# Patient Record
Sex: MALE | Race: WHITE | HISPANIC OR LATINO | Employment: OTHER | URBAN - METROPOLITAN AREA
[De-identification: names, ages, dates, MRNs, and addresses within clinical notes are randomized per-mention and may not be internally consistent; named-entity substitution may affect disease eponyms.]

---

## 2017-09-13 ENCOUNTER — HISTORICAL (OUTPATIENT)
Dept: ADMINISTRATIVE | Facility: HOSPITAL | Age: 67
End: 2017-09-13

## 2017-09-13 LAB
ABS NEUT (OLG): 3.43 X10(3)/MCL (ref 2.1–9.2)
ALBUMIN SERPL-MCNC: 3.8 GM/DL (ref 3.4–5)
ALBUMIN/GLOB SERPL: 1.2 RATIO (ref 1.1–2)
ALP SERPL-CCNC: 77 UNIT/L (ref 50–136)
ALT SERPL-CCNC: 20 UNIT/L (ref 12–78)
AST SERPL-CCNC: 16 UNIT/L (ref 15–37)
BASOPHILS # BLD AUTO: 0 X10(3)/MCL (ref 0–0.2)
BASOPHILS NFR BLD AUTO: 0.3 %
BILIRUB SERPL-MCNC: 0.5 MG/DL (ref 0.2–1)
BILIRUBIN DIRECT+TOT PNL SERPL-MCNC: 0.2 MG/DL (ref 0–0.5)
BILIRUBIN DIRECT+TOT PNL SERPL-MCNC: 0.3 MG/DL (ref 0–0.8)
BUN SERPL-MCNC: 9 MG/DL (ref 7–18)
CALCIUM SERPL-MCNC: 9.1 MG/DL (ref 8.5–10.1)
CHLORIDE SERPL-SCNC: 106 MMOL/L (ref 98–107)
CO2 SERPL-SCNC: 26 MMOL/L (ref 21–32)
CREAT SERPL-MCNC: 0.65 MG/DL (ref 0.7–1.3)
EOSINOPHIL # BLD AUTO: 0.2 X10(3)/MCL (ref 0–0.9)
EOSINOPHIL NFR BLD AUTO: 2.7 %
ERYTHROCYTE [DISTWIDTH] IN BLOOD BY AUTOMATED COUNT: 12.2 % (ref 11.5–17)
GLOBULIN SER-MCNC: 3.1 GM/DL (ref 2.4–3.5)
GLUCOSE SERPL-MCNC: 92 MG/DL (ref 74–106)
HCT VFR BLD AUTO: 39.8 % (ref 42–52)
HGB BLD-MCNC: 13.5 GM/DL (ref 14–18)
LYMPHOCYTES # BLD AUTO: 1.6 X10(3)/MCL (ref 0.6–4.6)
LYMPHOCYTES NFR BLD AUTO: 27.6 %
MCH RBC QN AUTO: 33.5 PG (ref 27–31)
MCHC RBC AUTO-ENTMCNC: 33.9 GM/DL (ref 33–36)
MCV RBC AUTO: 98.8 FL (ref 80–94)
MONOCYTES # BLD AUTO: 0.6 X10(3)/MCL (ref 0.1–1.3)
MONOCYTES NFR BLD AUTO: 10.9 %
NEUTROPHILS # BLD AUTO: 3.4 X10(3)/MCL (ref 2.1–9.2)
NEUTROPHILS NFR BLD AUTO: 58.5 %
PLATELET # BLD AUTO: 234 X10(3)/MCL (ref 130–400)
PMV BLD AUTO: 9.5 FL (ref 9.4–12.4)
POTASSIUM SERPL-SCNC: 4.5 MMOL/L (ref 3.5–5.1)
PROT SERPL-MCNC: 6.9 GM/DL (ref 6.4–8.2)
PROT SERPL-MCNC: 7 GM/DL
RBC # BLD AUTO: 4.03 X10(6)/MCL (ref 4.7–6.1)
SODIUM SERPL-SCNC: 143 MMOL/L (ref 136–145)
WBC # SPEC AUTO: 5.9 X10(3)/MCL (ref 4.5–11.5)

## 2018-03-01 ENCOUNTER — HISTORICAL (OUTPATIENT)
Dept: HEMATOLOGY/ONCOLOGY | Facility: CLINIC | Age: 68
End: 2018-03-01

## 2018-03-01 LAB
ABS NEUT (OLG): 3.3 X10(3)/MCL (ref 2.1–9.2)
ALBUMIN SERPL-MCNC: 3.8 GM/DL (ref 3.4–5)
ALBUMIN/GLOB SERPL: 1.3 {RATIO}
ALP SERPL-CCNC: 70 UNIT/L (ref 50–136)
ALT SERPL-CCNC: 22 UNIT/L (ref 12–78)
AST SERPL-CCNC: 18 UNIT/L (ref 15–37)
BASOPHILS # BLD AUTO: 0 X10(3)/MCL (ref 0–0.2)
BASOPHILS NFR BLD AUTO: 0.4 %
BILIRUB SERPL-MCNC: 0.9 MG/DL (ref 0.2–1)
BILIRUBIN DIRECT+TOT PNL SERPL-MCNC: 0.2 MG/DL (ref 0–0.2)
BILIRUBIN DIRECT+TOT PNL SERPL-MCNC: 0.7 MG/DL (ref 0–0.8)
BUN SERPL-MCNC: 10 MG/DL (ref 7–18)
CALCIUM SERPL-MCNC: 8.8 MG/DL (ref 8.5–10.1)
CHLORIDE SERPL-SCNC: 104 MMOL/L (ref 98–107)
CO2 SERPL-SCNC: 27 MMOL/L (ref 21–32)
CREAT SERPL-MCNC: 0.64 MG/DL (ref 0.7–1.3)
EOSINOPHIL # BLD AUTO: 0.1 X10(3)/MCL (ref 0–0.9)
EOSINOPHIL NFR BLD AUTO: 1.1 %
ERYTHROCYTE [DISTWIDTH] IN BLOOD BY AUTOMATED COUNT: 13 % (ref 11.5–17)
GLOBULIN SER-MCNC: 2.9 GM/DL (ref 2.4–3.5)
GLUCOSE SERPL-MCNC: 90 MG/DL (ref 74–106)
HCT VFR BLD AUTO: 39.2 % (ref 42–52)
HGB BLD-MCNC: 13.4 GM/DL (ref 14–18)
LYMPHOCYTES # BLD AUTO: 1.5 X10(3)/MCL (ref 0.6–4.6)
LYMPHOCYTES NFR BLD AUTO: 26.8 %
MCH RBC QN AUTO: 33.2 PG (ref 27–31)
MCHC RBC AUTO-ENTMCNC: 34.2 GM/DL (ref 33–36)
MCV RBC AUTO: 97 FL (ref 80–94)
MONOCYTES # BLD AUTO: 0.7 X10(3)/MCL (ref 0.1–1.3)
MONOCYTES NFR BLD AUTO: 12.6 %
NEUTROPHILS # BLD AUTO: 3.3 X10(3)/MCL (ref 2.1–9.2)
NEUTROPHILS NFR BLD AUTO: 59.1 %
PLATELET # BLD AUTO: 204 X10(3)/MCL (ref 130–400)
PMV BLD AUTO: 9.2 FL (ref 9.4–12.4)
POTASSIUM SERPL-SCNC: 4.5 MMOL/L (ref 3.5–5.1)
PROT SERPL-MCNC: 6.6 GM/DL
PROT SERPL-MCNC: 6.7 GM/DL (ref 6.4–8.2)
RBC # BLD AUTO: 4.04 X10(6)/MCL (ref 4.7–6.1)
SODIUM SERPL-SCNC: 138 MMOL/L (ref 136–145)
WBC # SPEC AUTO: 5.6 X10(3)/MCL (ref 4.5–11.5)

## 2018-09-04 ENCOUNTER — HISTORICAL (OUTPATIENT)
Dept: HEMATOLOGY/ONCOLOGY | Facility: CLINIC | Age: 68
End: 2018-09-04

## 2018-09-04 LAB
ABS NEUT (OLG): 4.13 X10(3)/MCL (ref 2.1–9.2)
ALBUMIN SERPL-MCNC: 3.8 GM/DL (ref 3.4–5)
ALBUMIN/GLOB SERPL: 1.2 {RATIO}
ALP SERPL-CCNC: 66 UNIT/L (ref 50–136)
ALT SERPL-CCNC: 38 UNIT/L (ref 12–78)
AST SERPL-CCNC: 24 UNIT/L (ref 15–37)
BASOPHILS # BLD AUTO: 0 X10(3)/MCL (ref 0–0.2)
BASOPHILS NFR BLD AUTO: 0.3 %
BILIRUB SERPL-MCNC: 0.9 MG/DL (ref 0.2–1)
BILIRUBIN DIRECT+TOT PNL SERPL-MCNC: 0.2 MG/DL (ref 0–0.2)
BILIRUBIN DIRECT+TOT PNL SERPL-MCNC: 0.7 MG/DL (ref 0–0.8)
BUN SERPL-MCNC: 11 MG/DL (ref 7–18)
CALCIUM SERPL-MCNC: 8.8 MG/DL (ref 8.5–10.1)
CHLORIDE SERPL-SCNC: 103 MMOL/L (ref 98–107)
CO2 SERPL-SCNC: 30 MMOL/L (ref 21–32)
CREAT SERPL-MCNC: 0.7 MG/DL (ref 0.7–1.3)
EOSINOPHIL # BLD AUTO: 0.2 X10(3)/MCL (ref 0–0.9)
EOSINOPHIL NFR BLD AUTO: 2.5 %
ERYTHROCYTE [DISTWIDTH] IN BLOOD BY AUTOMATED COUNT: 12.7 % (ref 11.5–17)
GLOBULIN SER-MCNC: 3.1 GM/DL (ref 2.4–3.5)
GLUCOSE SERPL-MCNC: 96 MG/DL (ref 74–106)
HCT VFR BLD AUTO: 40.6 % (ref 42–52)
HGB BLD-MCNC: 13.5 GM/DL (ref 14–18)
LYMPHOCYTES # BLD AUTO: 1.8 X10(3)/MCL (ref 0.6–4.6)
LYMPHOCYTES NFR BLD AUTO: 26.1 %
MCH RBC QN AUTO: 33.2 PG (ref 27–31)
MCHC RBC AUTO-ENTMCNC: 33.3 GM/DL (ref 33–36)
MCV RBC AUTO: 99.8 FL (ref 80–94)
MONOCYTES # BLD AUTO: 0.7 X10(3)/MCL (ref 0.1–1.3)
MONOCYTES NFR BLD AUTO: 10.4 %
NEUTROPHILS # BLD AUTO: 4.1 X10(3)/MCL (ref 2.1–9.2)
NEUTROPHILS NFR BLD AUTO: 60.7 %
PLATELET # BLD AUTO: 187 X10(3)/MCL (ref 130–400)
PMV BLD AUTO: 10.4 FL (ref 9.4–12.4)
POTASSIUM SERPL-SCNC: 4.9 MMOL/L (ref 3.5–5.1)
PROT SERPL-MCNC: 6.7 GM/DL
PROT SERPL-MCNC: 6.9 GM/DL (ref 6.4–8.2)
RBC # BLD AUTO: 4.07 X10(6)/MCL (ref 4.7–6.1)
SODIUM SERPL-SCNC: 139 MMOL/L (ref 136–145)
WBC # SPEC AUTO: 6.8 X10(3)/MCL (ref 4.5–11.5)

## 2018-09-13 ENCOUNTER — HISTORICAL (OUTPATIENT)
Dept: ADMINISTRATIVE | Facility: HOSPITAL | Age: 68
End: 2018-09-13

## 2018-09-13 LAB — PSA SERPL-MCNC: 2.96 NG/ML (ref 0–4)

## 2019-04-01 ENCOUNTER — HISTORICAL (OUTPATIENT)
Dept: HEMATOLOGY/ONCOLOGY | Facility: CLINIC | Age: 69
End: 2019-04-01

## 2019-04-01 LAB
ABS NEUT (OLG): 3.65 X10(3)/MCL (ref 2.1–9.2)
ALBUMIN SERPL-MCNC: 3.8 GM/DL (ref 3.4–5)
ALBUMIN/GLOB SERPL: 1.3 {RATIO}
ALP SERPL-CCNC: 70 UNIT/L (ref 50–136)
ALT SERPL-CCNC: 24 UNIT/L (ref 12–78)
AST SERPL-CCNC: 17 UNIT/L (ref 15–37)
BASOPHILS # BLD AUTO: 0 X10(3)/MCL (ref 0–0.2)
BASOPHILS NFR BLD AUTO: 0.5 %
BILIRUB SERPL-MCNC: 1.2 MG/DL (ref 0.2–1)
BILIRUBIN DIRECT+TOT PNL SERPL-MCNC: 0.2 MG/DL (ref 0–0.2)
BILIRUBIN DIRECT+TOT PNL SERPL-MCNC: 1 MG/DL (ref 0–0.8)
BUN SERPL-MCNC: 10 MG/DL (ref 7–18)
CALCIUM SERPL-MCNC: 8.9 MG/DL (ref 8.5–10.1)
CHLORIDE SERPL-SCNC: 105 MMOL/L (ref 98–107)
CO2 SERPL-SCNC: 27 MMOL/L (ref 21–32)
CREAT SERPL-MCNC: 0.71 MG/DL (ref 0.7–1.3)
EOSINOPHIL # BLD AUTO: 0.1 X10(3)/MCL (ref 0–0.9)
EOSINOPHIL NFR BLD AUTO: 2.3 %
ERYTHROCYTE [DISTWIDTH] IN BLOOD BY AUTOMATED COUNT: 12.6 % (ref 11.5–17)
GLOBULIN SER-MCNC: 3 GM/DL (ref 2.4–3.5)
GLUCOSE SERPL-MCNC: 94 MG/DL (ref 74–106)
HCT VFR BLD AUTO: 40.8 % (ref 42–52)
HGB BLD-MCNC: 13.6 GM/DL (ref 14–18)
LYMPHOCYTES # BLD AUTO: 1.5 X10(3)/MCL (ref 0.6–4.6)
LYMPHOCYTES NFR BLD AUTO: 24.8 %
MCH RBC QN AUTO: 33.4 PG (ref 27–31)
MCHC RBC AUTO-ENTMCNC: 33.3 GM/DL (ref 33–36)
MCV RBC AUTO: 100.2 FL (ref 80–94)
MONOCYTES # BLD AUTO: 0.6 X10(3)/MCL (ref 0.1–1.3)
MONOCYTES NFR BLD AUTO: 10.9 %
NEUTROPHILS # BLD AUTO: 3.6 X10(3)/MCL (ref 2.1–9.2)
NEUTROPHILS NFR BLD AUTO: 61.2 %
PLATELET # BLD AUTO: 230 X10(3)/MCL (ref 130–400)
PMV BLD AUTO: 9.4 FL (ref 9.4–12.4)
POTASSIUM SERPL-SCNC: 4.6 MMOL/L (ref 3.5–5.1)
PROT SERPL-MCNC: 6.5 GM/DL
PROT SERPL-MCNC: 6.8 GM/DL (ref 6.4–8.2)
RBC # BLD AUTO: 4.07 X10(6)/MCL (ref 4.7–6.1)
SODIUM SERPL-SCNC: 138 MMOL/L (ref 136–145)
WBC # SPEC AUTO: 6 X10(3)/MCL (ref 4.5–11.5)

## 2019-09-30 ENCOUNTER — HISTORICAL (OUTPATIENT)
Dept: HEMATOLOGY/ONCOLOGY | Facility: CLINIC | Age: 69
End: 2019-09-30

## 2019-09-30 LAB
ABS NEUT (OLG): 4.03 X10(3)/MCL (ref 2.1–9.2)
ALBUMIN SERPL-MCNC: 3.8 GM/DL (ref 3.4–5)
ALBUMIN/GLOB SERPL: 1.4 RATIO (ref 1.1–2)
ALP SERPL-CCNC: 65 UNIT/L (ref 50–136)
ALT SERPL-CCNC: 27 UNIT/L (ref 12–78)
AST SERPL-CCNC: 19 UNIT/L (ref 15–37)
BASOPHILS # BLD AUTO: 0 X10(3)/MCL (ref 0–0.2)
BASOPHILS NFR BLD AUTO: 0.4 %
BILIRUB SERPL-MCNC: 0.9 MG/DL (ref 0.2–1)
BILIRUBIN DIRECT+TOT PNL SERPL-MCNC: 0.2 MG/DL (ref 0–0.5)
BILIRUBIN DIRECT+TOT PNL SERPL-MCNC: 0.7 MG/DL (ref 0–0.8)
BUN SERPL-MCNC: 14 MG/DL (ref 7–18)
CALCIUM SERPL-MCNC: 9.1 MG/DL (ref 8.5–10.1)
CHLORIDE SERPL-SCNC: 103 MMOL/L (ref 98–107)
CO2 SERPL-SCNC: 31 MMOL/L (ref 21–32)
CREAT SERPL-MCNC: 0.74 MG/DL (ref 0.7–1.3)
EOSINOPHIL # BLD AUTO: 0.2 X10(3)/MCL (ref 0–0.9)
EOSINOPHIL NFR BLD AUTO: 2.5 %
ERYTHROCYTE [DISTWIDTH] IN BLOOD BY AUTOMATED COUNT: 12.5 % (ref 11.5–17)
GLOBULIN SER-MCNC: 2.8 GM/DL (ref 2.4–3.5)
GLUCOSE SERPL-MCNC: 102 MG/DL (ref 74–106)
HCT VFR BLD AUTO: 42 % (ref 42–52)
HGB BLD-MCNC: 14 GM/DL (ref 14–18)
LYMPHOCYTES # BLD AUTO: 1.7 X10(3)/MCL (ref 0.6–4.6)
LYMPHOCYTES NFR BLD AUTO: 25.5 %
MCH RBC QN AUTO: 33 PG (ref 27–31)
MCHC RBC AUTO-ENTMCNC: 33.3 GM/DL (ref 33–36)
MCV RBC AUTO: 99.1 FL (ref 80–94)
MONOCYTES # BLD AUTO: 0.8 X10(3)/MCL (ref 0.1–1.3)
MONOCYTES NFR BLD AUTO: 11.4 %
NEUTROPHILS # BLD AUTO: 4 X10(3)/MCL (ref 2.1–9.2)
NEUTROPHILS NFR BLD AUTO: 59.9 %
PLATELET # BLD AUTO: 194 X10(3)/MCL (ref 130–400)
PMV BLD AUTO: 8.7 FL (ref 9.4–12.4)
POTASSIUM SERPL-SCNC: 4.8 MMOL/L (ref 3.5–5.1)
PROT SERPL-MCNC: 6.4 GM/DL
PROT SERPL-MCNC: 6.6 GM/DL (ref 6.4–8.2)
RBC # BLD AUTO: 4.24 X10(6)/MCL (ref 4.7–6.1)
SODIUM SERPL-SCNC: 138 MMOL/L (ref 136–145)
WBC # SPEC AUTO: 6.7 X10(3)/MCL (ref 4.5–11.5)

## 2019-11-18 ENCOUNTER — HISTORICAL (OUTPATIENT)
Dept: ADMINISTRATIVE | Facility: HOSPITAL | Age: 69
End: 2019-11-18

## 2019-12-31 LAB
INFLUENZA A ANTIGEN, POC: POSITIVE
INFLUENZA B ANTIGEN, POC: NEGATIVE
RAPID GROUP A STREP (OHS): NEGATIVE

## 2020-02-17 LAB — CRC RECOMMENDATION EXT: NORMAL

## 2020-04-06 ENCOUNTER — HISTORICAL (OUTPATIENT)
Dept: HEMATOLOGY/ONCOLOGY | Facility: CLINIC | Age: 70
End: 2020-04-06

## 2020-04-06 LAB
ABS NEUT (OLG): 4.33 X10(3)/MCL (ref 2.1–9.2)
ALBUMIN SERPL-MCNC: 4 GM/DL (ref 3.4–4.8)
ALBUMIN/GLOB SERPL: 1.4 RATIO (ref 1.1–2)
ALP SERPL-CCNC: 78 UNIT/L (ref 40–150)
ALT SERPL-CCNC: 24 UNIT/L (ref 0–55)
AST SERPL-CCNC: 23 UNIT/L (ref 5–34)
B2 MICROGLOB SERPL-MCNC: 1.49 MG/L
BASOPHILS # BLD AUTO: 0 X10(3)/MCL (ref 0–0.2)
BASOPHILS NFR BLD AUTO: 0.4 %
BILIRUB SERPL-MCNC: 0.7 MG/DL
BILIRUBIN DIRECT+TOT PNL SERPL-MCNC: 0.3 MG/DL (ref 0–0.5)
BILIRUBIN DIRECT+TOT PNL SERPL-MCNC: 0.4 MG/DL (ref 0–0.8)
BUN SERPL-MCNC: 15 MG/DL (ref 8.4–25.7)
CALCIUM SERPL-MCNC: 8.7 MG/DL (ref 8.8–10)
CHLORIDE SERPL-SCNC: 105 MMOL/L (ref 98–107)
CO2 SERPL-SCNC: 28 MMOL/L (ref 23–31)
CREAT SERPL-MCNC: 0.82 MG/DL (ref 0.73–1.18)
EOSINOPHIL # BLD AUTO: 0.1 X10(3)/MCL (ref 0–0.9)
EOSINOPHIL NFR BLD AUTO: 1.6 %
ERYTHROCYTE [DISTWIDTH] IN BLOOD BY AUTOMATED COUNT: 13 % (ref 11.5–17)
GLOBULIN SER-MCNC: 2.9 GM/DL (ref 2.4–3.5)
GLUCOSE SERPL-MCNC: 100 MG/DL (ref 82–115)
HCT VFR BLD AUTO: 42 % (ref 42–52)
HGB BLD-MCNC: 14 GM/DL (ref 14–18)
LYMPHOCYTES # BLD AUTO: 1.7 X10(3)/MCL (ref 0.6–4.6)
LYMPHOCYTES NFR BLD AUTO: 24 %
MCH RBC QN AUTO: 33.7 PG (ref 27–31)
MCHC RBC AUTO-ENTMCNC: 33.3 GM/DL (ref 33–36)
MCV RBC AUTO: 101 FL (ref 80–94)
MONOCYTES # BLD AUTO: 0.8 X10(3)/MCL (ref 0.1–1.3)
MONOCYTES NFR BLD AUTO: 11.8 %
NEUTROPHILS # BLD AUTO: 4.3 X10(3)/MCL (ref 2.1–9.2)
NEUTROPHILS NFR BLD AUTO: 62.1 %
PLATELET # BLD AUTO: 206 X10(3)/MCL (ref 130–400)
PMV BLD AUTO: 9.3 FL (ref 9.4–12.4)
POTASSIUM SERPL-SCNC: 4.7 MMOL/L (ref 3.5–5.1)
PROT SERPL-MCNC: 6.8 GM/DL
PROT SERPL-MCNC: 6.9 GM/DL (ref 5.8–7.6)
RBC # BLD AUTO: 4.16 X10(6)/MCL (ref 4.7–6.1)
SODIUM SERPL-SCNC: 139 MMOL/L (ref 136–145)
WBC # SPEC AUTO: 7 X10(3)/MCL (ref 4.5–11.5)

## 2020-08-11 ENCOUNTER — HISTORICAL (OUTPATIENT)
Dept: HEMATOLOGY/ONCOLOGY | Facility: CLINIC | Age: 70
End: 2020-08-11

## 2020-08-11 LAB
ABS NEUT (OLG): 5.07 X10(3)/MCL (ref 2.1–9.2)
ALBUMIN SERPL-MCNC: 3.9 GM/DL (ref 3.4–5)
ALBUMIN/GLOB SERPL: 1.3 RATIO (ref 1.1–2)
ALP SERPL-CCNC: 81 UNIT/L (ref 40–150)
ALT SERPL-CCNC: 23 UNIT/L (ref 0–55)
AST SERPL-CCNC: 24 UNIT/L (ref 5–34)
B2 MICROGLOB SERPL-MCNC: 1.7 MG/L
BASOPHILS # BLD AUTO: 0 X10(3)/MCL (ref 0–0.2)
BASOPHILS NFR BLD AUTO: 0.3 %
BILIRUB SERPL-MCNC: 1.3 MG/DL
BILIRUBIN DIRECT+TOT PNL SERPL-MCNC: 0.5 MG/DL (ref 0–0.5)
BILIRUBIN DIRECT+TOT PNL SERPL-MCNC: 0.8 MG/DL (ref 0–0.8)
BUN SERPL-MCNC: 15.4 MG/DL (ref 8.4–25.7)
CALCIUM SERPL-MCNC: 9.1 MG/DL (ref 8.8–10)
CHLORIDE SERPL-SCNC: 102 MMOL/L (ref 98–107)
CO2 SERPL-SCNC: 26 MMOL/L (ref 23–31)
CREAT SERPL-MCNC: 0.77 MG/DL (ref 0.73–1.18)
EOSINOPHIL # BLD AUTO: 0.2 X10(3)/MCL (ref 0–0.9)
EOSINOPHIL NFR BLD AUTO: 2.4 %
ERYTHROCYTE [DISTWIDTH] IN BLOOD BY AUTOMATED COUNT: 12.3 % (ref 11.5–17)
GLOBULIN SER-MCNC: 3 GM/DL (ref 2.4–3.5)
GLUCOSE SERPL-MCNC: 89 MG/DL (ref 82–115)
HCT VFR BLD AUTO: 42.4 % (ref 42–52)
HGB BLD-MCNC: 14.1 GM/DL (ref 14–18)
LYMPHOCYTES # BLD AUTO: 1.9 X10(3)/MCL (ref 0.6–4.6)
LYMPHOCYTES NFR BLD AUTO: 23.4 %
MCH RBC QN AUTO: 33.5 PG (ref 27–31)
MCHC RBC AUTO-ENTMCNC: 33.3 GM/DL (ref 33–36)
MCV RBC AUTO: 100.7 FL (ref 80–94)
MONOCYTES # BLD AUTO: 0.8 X10(3)/MCL (ref 0.1–1.3)
MONOCYTES NFR BLD AUTO: 9.9 %
NEUTROPHILS # BLD AUTO: 5.1 X10(3)/MCL (ref 2.1–9.2)
NEUTROPHILS NFR BLD AUTO: 63.6 %
PLATELET # BLD AUTO: 209 X10(3)/MCL (ref 130–400)
PMV BLD AUTO: 9.1 FL (ref 9.4–12.4)
POTASSIUM SERPL-SCNC: 4.6 MMOL/L (ref 3.5–5.1)
PROT SERPL-MCNC: 6.9 GM/DL (ref 5.8–7.6)
PROT SERPL-MCNC: 7 GM/DL
RBC # BLD AUTO: 4.21 X10(6)/MCL (ref 4.7–6.1)
SODIUM SERPL-SCNC: 140 MMOL/L (ref 136–145)
WBC # SPEC AUTO: 8 X10(3)/MCL (ref 4.5–11.5)

## 2020-12-04 ENCOUNTER — HISTORICAL (OUTPATIENT)
Dept: HEMATOLOGY/ONCOLOGY | Facility: CLINIC | Age: 70
End: 2020-12-04

## 2020-12-04 LAB
ABS NEUT (OLG): 3.43 X10(3)/MCL (ref 2.1–9.2)
ALBUMIN SERPL-MCNC: 4.1 GM/DL (ref 3.4–4.8)
ALBUMIN/GLOB SERPL: 1.5 RATIO (ref 1.1–2)
ALP SERPL-CCNC: 68 UNIT/L (ref 40–150)
ALT SERPL-CCNC: 23 UNIT/L (ref 0–55)
AST SERPL-CCNC: 19 UNIT/L (ref 5–34)
B2 MICROGLOB SERPL-MCNC: 1.69 MG/L
BASOPHILS # BLD AUTO: 0 X10(3)/MCL (ref 0–0.2)
BASOPHILS NFR BLD AUTO: 0.7 %
BILIRUB SERPL-MCNC: 0.7 MG/DL
BILIRUBIN DIRECT+TOT PNL SERPL-MCNC: 0.3 MG/DL (ref 0–0.5)
BILIRUBIN DIRECT+TOT PNL SERPL-MCNC: 0.4 MG/DL (ref 0–0.8)
BUN SERPL-MCNC: 12.4 MG/DL (ref 8.4–25.7)
CALCIUM SERPL-MCNC: 8.9 MG/DL (ref 8.8–10)
CHLORIDE SERPL-SCNC: 101 MMOL/L (ref 98–107)
CO2 SERPL-SCNC: 27 MMOL/L (ref 23–31)
CREAT SERPL-MCNC: 0.65 MG/DL (ref 0.73–1.18)
EOSINOPHIL # BLD AUTO: 0.3 X10(3)/MCL (ref 0–0.9)
EOSINOPHIL NFR BLD AUTO: 4.6 %
ERYTHROCYTE [DISTWIDTH] IN BLOOD BY AUTOMATED COUNT: 12.8 % (ref 11.5–17)
GLOBULIN SER-MCNC: 2.8 GM/DL (ref 2.4–3.5)
GLUCOSE SERPL-MCNC: 91 MG/DL (ref 82–115)
HCT VFR BLD AUTO: 39.3 % (ref 42–52)
HGB BLD-MCNC: 13.2 GM/DL (ref 14–18)
LYMPHOCYTES # BLD AUTO: 1.6 X10(3)/MCL (ref 0.6–4.6)
LYMPHOCYTES NFR BLD AUTO: 27.2 %
MCH RBC QN AUTO: 33.2 PG (ref 27–31)
MCHC RBC AUTO-ENTMCNC: 33.6 GM/DL (ref 33–36)
MCV RBC AUTO: 98.7 FL (ref 80–94)
MONOCYTES # BLD AUTO: 0.6 X10(3)/MCL (ref 0.1–1.3)
MONOCYTES NFR BLD AUTO: 10.6 %
NEUTROPHILS # BLD AUTO: 3.4 X10(3)/MCL (ref 2.1–9.2)
NEUTROPHILS NFR BLD AUTO: 56.7 %
PLATELET # BLD AUTO: 209 X10(3)/MCL (ref 130–400)
PMV BLD AUTO: 9.1 FL (ref 9.4–12.4)
POTASSIUM SERPL-SCNC: 4.6 MMOL/L (ref 3.5–5.1)
PROT SERPL-MCNC: 6.8 GM/DL
PROT SERPL-MCNC: 6.9 GM/DL (ref 5.8–7.6)
RBC # BLD AUTO: 3.98 X10(6)/MCL (ref 4.7–6.1)
SODIUM SERPL-SCNC: 137 MMOL/L (ref 136–145)
WBC # SPEC AUTO: 6 X10(3)/MCL (ref 4.5–11.5)

## 2021-04-05 ENCOUNTER — HISTORICAL (OUTPATIENT)
Dept: ADMINISTRATIVE | Facility: HOSPITAL | Age: 71
End: 2021-04-05

## 2021-04-05 LAB
ABS NEUT (OLG): 3.54 X10(3)/MCL (ref 2.1–9.2)
ALBUMIN SERPL-MCNC: 4.1 GM/DL (ref 3.4–4.8)
ALBUMIN/GLOB SERPL: 1.6 RATIO (ref 1.1–2)
ALP SERPL-CCNC: 67 UNIT/L (ref 40–150)
ALT SERPL-CCNC: 18 UNIT/L (ref 0–55)
AST SERPL-CCNC: 22 UNIT/L (ref 5–34)
B2 MICROGLOB SERPL-MCNC: 1.76 MG/L
BASOPHILS # BLD AUTO: 0 X10(3)/MCL (ref 0–0.2)
BASOPHILS NFR BLD AUTO: 0.3 %
BILIRUB SERPL-MCNC: 0.7 MG/DL
BILIRUBIN DIRECT+TOT PNL SERPL-MCNC: 0.3 MG/DL (ref 0–0.5)
BILIRUBIN DIRECT+TOT PNL SERPL-MCNC: 0.4 MG/DL (ref 0–0.8)
BUN SERPL-MCNC: 11.1 MG/DL (ref 8.4–25.7)
CALCIUM SERPL-MCNC: 9.4 MG/DL (ref 8.8–10)
CHLORIDE SERPL-SCNC: 102 MMOL/L (ref 98–107)
CO2 SERPL-SCNC: 27 MMOL/L (ref 23–31)
CREAT SERPL-MCNC: 0.69 MG/DL (ref 0.73–1.18)
EOSINOPHIL # BLD AUTO: 0.2 X10(3)/MCL (ref 0–0.9)
EOSINOPHIL NFR BLD AUTO: 3 %
ERYTHROCYTE [DISTWIDTH] IN BLOOD BY AUTOMATED COUNT: 12.8 % (ref 11.5–17)
GLOBULIN SER-MCNC: 2.5 GM/DL (ref 2.4–3.5)
GLUCOSE SERPL-MCNC: 91 MG/DL (ref 82–115)
HCT VFR BLD AUTO: 40 % (ref 42–52)
HGB BLD-MCNC: 13.6 GM/DL (ref 14–18)
LYMPHOCYTES # BLD AUTO: 1.8 X10(3)/MCL (ref 0.6–4.6)
LYMPHOCYTES NFR BLD AUTO: 29.3 %
MCH RBC QN AUTO: 33.1 PG (ref 27–31)
MCHC RBC AUTO-ENTMCNC: 34 GM/DL (ref 33–36)
MCV RBC AUTO: 97.3 FL (ref 80–94)
MONOCYTES # BLD AUTO: 0.7 X10(3)/MCL (ref 0.1–1.3)
MONOCYTES NFR BLD AUTO: 10.7 %
NEUTROPHILS # BLD AUTO: 3.5 X10(3)/MCL (ref 2.1–9.2)
NEUTROPHILS NFR BLD AUTO: 56.5 %
PLATELET # BLD AUTO: 207 X10(3)/MCL (ref 130–400)
PMV BLD AUTO: 9.2 FL (ref 9.4–12.4)
POTASSIUM SERPL-SCNC: 4.2 MMOL/L (ref 3.5–5.1)
PROT SERPL-MCNC: 6.6 GM/DL
PROT SERPL-MCNC: 6.6 GM/DL (ref 5.8–7.6)
RBC # BLD AUTO: 4.11 X10(6)/MCL (ref 4.7–6.1)
SODIUM SERPL-SCNC: 136 MMOL/L (ref 136–145)
WBC # SPEC AUTO: 6.3 X10(3)/MCL (ref 4.5–11.5)

## 2021-10-07 ENCOUNTER — HISTORICAL (OUTPATIENT)
Dept: HEMATOLOGY/ONCOLOGY | Facility: CLINIC | Age: 71
End: 2021-10-07

## 2021-10-07 LAB
ABS NEUT (OLG): 3.24 X10(3)/MCL (ref 2.1–9.2)
ALBUMIN % SPEP (OHS): 56.74 % (ref 48.1–59.5)
ALBUMIN SERPL-MCNC: 4.1 GM/DL (ref 3.4–4.8)
ALBUMIN SERPL-MCNC: 4.3 GM/DL (ref 3.4–4.8)
ALBUMIN/GLOB SERPL: 1.5 RATIO (ref 1.1–2)
ALBUMIN/GLOB SERPL: 1.6 RATIO (ref 1.1–2)
ALP SERPL-CCNC: 71 UNIT/L (ref 40–150)
ALPHA 1 GLOB (OHS): 0.2 GM/DL (ref 0–0.4)
ALPHA 1 GLOB% (OHS): 2.81 % (ref 2.3–4.9)
ALPHA 2 GLOB % (OHS): 8.62 % (ref 6.9–13)
ALPHA 2 GLOB (OHS): 0.6 GM/DL (ref 0.4–1)
ALT SERPL-CCNC: 20 UNIT/L (ref 0–55)
AST SERPL-CCNC: 21 UNIT/L (ref 5–34)
BASOPHILS # BLD AUTO: 0 X10(3)/MCL (ref 0–0.2)
BASOPHILS NFR BLD AUTO: 0.4 %
BETA GLOB (OHS): 0.92 GM/DL (ref 0.7–1.3)
BETA GLOB% (OHS): 13.13 % (ref 13.8–19.7)
BILIRUB SERPL-MCNC: 1.4 MG/DL
BILIRUBIN DIRECT+TOT PNL SERPL-MCNC: 0.5 MG/DL (ref 0–0.5)
BILIRUBIN DIRECT+TOT PNL SERPL-MCNC: 0.9 MG/DL (ref 0–0.8)
BUN SERPL-MCNC: 9.2 MG/DL (ref 8.4–25.7)
CALCIUM SERPL-MCNC: 9.6 MG/DL (ref 8.8–10)
CHLORIDE SERPL-SCNC: 100 MMOL/L (ref 98–107)
CO2 SERPL-SCNC: 26 MMOL/L (ref 23–31)
CREAT SERPL-MCNC: 0.64 MG/DL (ref 0.73–1.18)
EOSINOPHIL # BLD AUTO: 0.2 X10(3)/MCL (ref 0–0.9)
EOSINOPHIL NFR BLD AUTO: 2.7 %
ERYTHROCYTE [DISTWIDTH] IN BLOOD BY AUTOMATED COUNT: 12.5 % (ref 11.5–17)
GAMMA GLOBULIN % (OHS): 18.7 % (ref 10.1–21.9)
GAMMA GLOBULIN (OHS): 1.31 GM/DL (ref 0.4–1.8)
GLOBULIN SER-MCNC: 2.7 GM/DL (ref 2.4–3.5)
GLOBULIN SER-MCNC: 2.7 GM/DL (ref 2.4–3.5)
GLUCOSE SERPL-MCNC: 87 MG/DL (ref 82–115)
HCT VFR BLD AUTO: 41 % (ref 42–52)
HGB BLD-MCNC: 13.7 GM/DL (ref 14–18)
IFE INTERPRETATION (OHS): ABNORMAL
IGA SERPL-MCNC: 115 MG/DL (ref 101–645)
IGG SERPL-MCNC: 752 MG/DL (ref 240–1822)
IGM SERPL-MCNC: 746 MG/DL (ref 22–240)
LYMPHOCYTES # BLD AUTO: 1.7 X10(3)/MCL (ref 0.6–4.6)
LYMPHOCYTES NFR BLD AUTO: 29.8 %
M SPIKE % (OHS): 4.41 %
M SPIKE (OHS): 0.31 GM/DL
MCH RBC QN AUTO: 33.5 PG (ref 27–31)
MCHC RBC AUTO-ENTMCNC: 33.4 GM/DL (ref 33–36)
MCV RBC AUTO: 100.2 FL (ref 80–94)
MONOCYTES # BLD AUTO: 0.5 X10(3)/MCL (ref 0.1–1.3)
MONOCYTES NFR BLD AUTO: 9.3 %
NEUTROPHILS # BLD AUTO: 3.2 X10(3)/MCL (ref 2.1–9.2)
NEUTROPHILS NFR BLD AUTO: 57.6 %
PEP GRAPH (OHS): ABNORMAL
PLATELET # BLD AUTO: 209 X10(3)/MCL (ref 130–400)
PMV BLD AUTO: 9.1 FL (ref 9.4–12.4)
POTASSIUM SERPL-SCNC: 4.7 MMOL/L (ref 3.5–5.1)
PROT SERPL-MCNC: 6.8 GM/DL (ref 5.8–7.6)
PROT SERPL-MCNC: 7 GM/DL (ref 5.8–7.6)
RBC # BLD AUTO: 4.09 X10(6)/MCL (ref 4.7–6.1)
SODIUM SERPL-SCNC: 138 MMOL/L (ref 136–145)
WBC # SPEC AUTO: 5.6 X10(3)/MCL (ref 4.5–11.5)

## 2022-03-05 ENCOUNTER — HISTORICAL (OUTPATIENT)
Dept: ADMINISTRATIVE | Facility: HOSPITAL | Age: 72
End: 2022-03-05

## 2022-04-11 ENCOUNTER — HISTORICAL (OUTPATIENT)
Dept: ADMINISTRATIVE | Facility: HOSPITAL | Age: 72
End: 2022-04-11
Payer: OTHER GOVERNMENT

## 2022-04-14 ENCOUNTER — HISTORICAL (OUTPATIENT)
Dept: HEMATOLOGY/ONCOLOGY | Facility: CLINIC | Age: 72
End: 2022-04-14
Payer: OTHER GOVERNMENT

## 2022-04-14 LAB
ABS NEUT (OLG): 3.15 (ref 2.1–9.2)
ALBUMIN SERPL-MCNC: 4 G/DL (ref 3.4–4.8)
ALBUMIN/GLOB SERPL: 1.5 {RATIO} (ref 1.1–2)
ALP SERPL-CCNC: 62 U/L (ref 40–150)
ALT SERPL-CCNC: 12 U/L (ref 0–55)
AST SERPL-CCNC: 17 U/L (ref 5–34)
B2 MICROGLOB SERPL-MCNC: 1.38
BASOPHILS # BLD AUTO: 0 10*3/UL (ref 0–0.2)
BASOPHILS NFR BLD AUTO: 0.5 %
BILIRUB SERPL-MCNC: 0.9 MG/DL
BILIRUBIN DIRECT+TOT PNL SERPL-MCNC: 0.3 (ref 0–0.5)
BILIRUBIN DIRECT+TOT PNL SERPL-MCNC: 0.6 (ref 0–0.8)
BUN SERPL-MCNC: 6.4 MG/DL (ref 8.4–25.7)
CALCIUM SERPL-MCNC: 9.2 MG/DL (ref 8.7–10.5)
CHLORIDE SERPL-SCNC: 99 MMOL/L (ref 98–107)
CO2 SERPL-SCNC: 24 MMOL/L (ref 23–31)
CREAT SERPL-MCNC: 0.7 MG/DL (ref 0.73–1.18)
EOSINOPHIL # BLD AUTO: 0.1 10*3/UL (ref 0–0.9)
EOSINOPHIL NFR BLD AUTO: 2.3 %
ERYTHROCYTE [DISTWIDTH] IN BLOOD BY AUTOMATED COUNT: 12.2 % (ref 11.5–17)
GLOBULIN SER-MCNC: 2.7 G/DL (ref 2.4–3.5)
GLUCOSE SERPL-MCNC: 83 MG/DL (ref 82–115)
HCT VFR BLD AUTO: 38.7 % (ref 42–52)
HEMOLYSIS INTERF INDEX SERPL-ACNC: 13
HGB BLD-MCNC: 13.1 G/DL (ref 14–18)
ICTERIC INTERF INDEX SERPL-ACNC: 1
LIPEMIC INTERF INDEX SERPL-ACNC: <0
LYMPHOCYTES # BLD AUTO: 1.7 10*3/UL (ref 0.6–4.6)
LYMPHOCYTES NFR BLD AUTO: 29.7 %
MANUAL DIFF? (OHS): NO
MCH RBC QN AUTO: 32.8 PG (ref 27–31)
MCHC RBC AUTO-ENTMCNC: 33.9 G/DL (ref 33–36)
MCV RBC AUTO: 97 FL (ref 80–94)
MONOCYTES # BLD AUTO: 0.6 10*3/UL (ref 0.1–1.3)
MONOCYTES NFR BLD AUTO: 10.7 %
NEUTROPHILS # BLD AUTO: 3.2 10*3/UL (ref 2.1–9.2)
NEUTROPHILS NFR BLD AUTO: 56.4 %
PLATELET # BLD AUTO: 244 10*3/UL (ref 130–400)
PMV BLD AUTO: 8.8 FL (ref 9.4–12.4)
POTASSIUM SERPL-SCNC: 4.3 MMOL/L (ref 3.5–5.1)
PROT SERPL-MCNC: 6.7 G/DL (ref 5.8–7.6)
RBC # BLD AUTO: 3.99 10*6/UL (ref 4.7–6.1)
SODIUM SERPL-SCNC: 135 MMOL/L (ref 136–145)
WBC # SPEC AUTO: 5.6 10*3/UL (ref 4.5–11.5)

## 2022-04-27 VITALS
SYSTOLIC BLOOD PRESSURE: 132 MMHG | DIASTOLIC BLOOD PRESSURE: 82 MMHG | HEIGHT: 71 IN | BODY MASS INDEX: 26.46 KG/M2 | WEIGHT: 189 LBS | OXYGEN SATURATION: 98 %

## 2022-05-17 DIAGNOSIS — G25.0 ESSENTIAL TREMOR: ICD-10-CM

## 2022-05-17 DIAGNOSIS — G25.0 BENIGN ESSENTIAL TREMOR: Primary | ICD-10-CM

## 2022-05-24 ENCOUNTER — PATIENT MESSAGE (OUTPATIENT)
Dept: NEUROSURGERY | Facility: CLINIC | Age: 72
End: 2022-05-24

## 2022-05-24 ENCOUNTER — CLINICAL SUPPORT (OUTPATIENT)
Dept: NEUROSURGERY | Facility: CLINIC | Age: 72
End: 2022-05-24
Payer: OTHER GOVERNMENT

## 2022-05-24 DIAGNOSIS — G25.0 BENIGN ESSENTIAL TREMOR: Primary | ICD-10-CM

## 2022-05-24 PROCEDURE — 99499 NO LOS: ICD-10-PCS | Mod: ,,, | Performed by: NEUROLOGICAL SURGERY

## 2022-05-24 PROCEDURE — 99499 UNLISTED E&M SERVICE: CPT | Mod: ,,, | Performed by: NEUROLOGICAL SURGERY

## 2022-05-25 RX ORDER — VITAMIN B COMPLEX
1 CAPSULE ORAL
Status: ON HOLD | COMMUNITY
End: 2022-06-07 | Stop reason: CLARIF

## 2022-05-25 RX ORDER — ZINC GLUCONATE 50 MG
50 TABLET ORAL DAILY
COMMUNITY
End: 2022-09-21

## 2022-05-27 PROBLEM — G62.9 POLYNEUROPATHY: Status: ACTIVE | Noted: 2022-05-27

## 2022-05-27 PROBLEM — G25.0 BENIGN ESSENTIAL TREMOR: Status: ACTIVE | Noted: 2022-05-27

## 2022-05-27 NOTE — PROGRESS NOTES
[facility]  Neurosurgery    HPI:  The patient has a history of essential tremor that was diagnosed several years ago.  He underwent bilateral VIM DBS in 2013 with Dr. Francisco in [place].  He had revision of the right electrode in 2015 due to severe left upper extremity tremors.  He moved to [place] in 2017 and has been following with Dr. Camargo for continued treatment and management of his tremors and DBS.  His left upper extremity tremors have never been well controlled with the DBS.  He was seen by Paul Anne with [employer] at one of his appointments with Dr. Camargo.  It was felt that the newer system with directional leads would provide better control of the left-sided tremors.  He was subsequently referred to Dr. Saenz for neurosurgical evaluation and possible revision of his DBS.  He was seen by Dr. Saenz in January, surgery was discussed and recommended at that time.  He presents today for his preoperative appointment.    He denies any new issues since his last appointment.  He complains of left upper extremity tremors that have continued to progress.  He is no longer able to do much with the left hand due to the severity of his tremors.  His right-sided tremor seem to be well controlled initially, although they have also continued to progress as well.  He feels they are not as well controlled now as they once were.  He has difficulty with activities in the kitchen due to right sided tremors. His dominant hand is his right hand.  His left sided tremors remain worse than the right.  He reports that when the voltage is turned up too high, it causes him to shake more.  He will also experience numbness in the left face any weird taste with higher voltage.  He does find that alcohol improves his tremors.  He reports an unsteady gait, uses a cane for mobility needs.  He has a history of neuropathy, which contributes to his gait difficulty.  He has a difficulty with short-term memory at  "times.  He mentions that he was diagnosed with PTSD prior to his DBS surgery.  He says it resolved after DBS was placed.     Of note, he did present to the ED at [facility] on  3/5/2022 following a syncopal episode at home. He reports he felt light-headed and began with tunnel vision. He then became unresponsive briefly. He did not have any deficits once he regained consciousness. Labwork and CT head performed in ED were unremarkable. He has not had any further episodes since that time.  [facility]    Past Medical History:   Diagnosis Date    Cancer     Essential tremor     [place] (hard of hearing)     Waldenstrom's macroglobulinemia      Past Surgical History:   Procedure Laterality Date    APPENDECTOMY      Bilateral VIM DBSand left chest IPG  07/02/2013    Dr. Francisco    revision of right VIM DBS lead Right 09/15/2015    Dr. Francisco    TONSILLECTOMY       (Not in a hospital admission)    Review of patient's allergies indicates:   Allergen Reactions    Bee sting kit Hives and Shortness Of Breath     Social History     Tobacco Use    Smoking status: Former Smoker    Smokeless tobacco: Never Used   Substance Use Topics    Alcohol use: Not on file     No family history on file.    Vital Signs  Pulse: 78  Resp: 16  BP: 120/78  BP Location: Right arm  Patient Position: Sitting  Pain Score: 0-No pain  Height and Weight  Height: 5' 10" (177.8 cm)  Weight: 82.1 kg (181 lb)  BSA (Calculated - sq m): 2.01 sq meters  BMI (Calculated): 26  Weight in (lb) to have BMI = 25: 173.9]      Physical Exam:  General: well developed, well nourished, no distress.   Head: normocephalic, atraumatic  Respiratory: respiration non-labored; clear to auscultation  Cardiac: RRR, No murmur  GI: abd soft, non-tender, non-distended  Pulses: 2+ and symmetric radial and dorsalis pedis. No lower extremity edema  Neurologic: Alert and oriented. Thought content appropriate.  GCS: Motor: " 6/Verbal: 5/Eyes: 4 GCS Total: 15  Mental Status: Awake, Alert, Oriented x3  Cranial nerves: face symmetric, tongue midline, CN II-XII grossly intact.   Eyes: pupils equal, round, reactive to light with accomodation, EOMI.   Sensory: intact to light touch throughout  Motor Strength:Moves all extremities spontaneously with good tone.  Full strength upper and lower extremities.  Tremors: Intention tremor bilaterally, severe on left, mild on right  Pulses: 2+ and symmetric radial and dorsalis pedis. No lower extremity edema  Gait: broad-based, cane  Incisions: scalp and chestwall incisions are well-healed    Assessment/Plan:  Problem List Items Addressed This Visit        Neuro    Benign essential tremor - Primary    Overview     The nature of the procedure, as well as its attendant risks, were discussed in detail with the patient.  All questions were answered.  They are agreement with proceeding with surgery as planned, and are tentatively scheduled for revision of right VIM DBS electrode (possibly bilateral) on 6/7/2022.           Relevant Orders    CBC Auto Differential    Comprehensive Metabolic Panel    X-Ray Chest PA And Lateral    EKG 12-lead      Other Visit Diagnoses     Essential tremor        Coagulation defect        Relevant Orders    CBC Auto Differential    APTT    Protime-INR

## 2022-05-27 NOTE — H&P (VIEW-ONLY)
[facility]  Neurosurgery    HPI:  The patient has a history of essential tremor that was diagnosed several years ago.  He underwent bilateral VIM DBS in 2013 with Dr. Francisco in [place].  He had revision of the right electrode in 2015 due to severe left upper extremity tremors.  He moved to [place] in 2017 and has been following with Dr. Camargo for continued treatment and management of his tremors and DBS.  His left upper extremity tremors have never been well controlled with the DBS.  He was seen by Paul Anne with [employer] at one of his appointments with Dr. Camargo.  It was felt that the newer system with directional leads would provide better control of the left-sided tremors.  He was subsequently referred to Dr. Saenz for neurosurgical evaluation and possible revision of his DBS.  He was seen by Dr. Saenz in January, surgery was discussed and recommended at that time.  He presents today for his preoperative appointment.    He denies any new issues since his last appointment.  He complains of left upper extremity tremors that have continued to progress.  He is no longer able to do much with the left hand due to the severity of his tremors.  His right-sided tremor seem to be well controlled initially, although they have also continued to progress as well.  He feels they are not as well controlled now as they once were.  He has difficulty with activities in the kitchen due to right sided tremors. His dominant hand is his right hand.  His left sided tremors remain worse than the right.  He reports that when the voltage is turned up too high, it causes him to shake more.  He will also experience numbness in the left face any weird taste with higher voltage.  He does find that alcohol improves his tremors.  He reports an unsteady gait, uses a cane for mobility needs.  He has a history of neuropathy, which contributes to his gait difficulty.  He has a difficulty with short-term memory at  "times.  He mentions that he was diagnosed with PTSD prior to his DBS surgery.  He says it resolved after DBS was placed.     Of note, he did present to the ED at [facility] on  3/5/2022 following a syncopal episode at home. He reports he felt light-headed and began with tunnel vision. He then became unresponsive briefly. He did not have any deficits once he regained consciousness. Labwork and CT head performed in ED were unremarkable. He has not had any further episodes since that time.  [facility]    Past Medical History:   Diagnosis Date    Cancer     Essential tremor     [place] (hard of hearing[place]     Waldenstrom's macroglobulinemia      Past Surgical History:   Procedure Laterality Date    APPENDECTOMY      Bilateral VIM DBSand left chest IPG  07/02/2013    Dr. Francisco    revision of right VIM DBS lead Right 09/15/2015    Dr. Francisco    TONSILLECTOMY       (Not in a hospital admission)    Review of patient's allergies indicates:   Allergen Reactions    Bee sting kit Hives and Shortness Of Breath     Social History     Tobacco Use    Smoking status: Former Smoker    Smokeless tobacco: Never Used   Substance Use Topics    Alcohol use: Not on file     No family history on file.    Vital Signs  Pulse: 78  Resp: 16  BP: 120/78  BP Location: Right arm  Patient Position: Sitting  Pain Score: 0-No pain  Height and Weight  Height: 5' 10" (177.8 cm)  Weight: 82.1 kg (181 lb)  BSA (Calculated - sq m): 2.01 sq meters  BMI (Calculated): 26  Weight in (lb) to have BMI = 25: 173.9]      Physical Exam:  General: well developed, well nourished, no distress.   Head: normocephalic, atraumatic  Respiratory: respiration non-labored; clear to auscultation  Cardiac: RRR, No murmur  GI: abd soft, non-tender, non-distended  Pulses: 2+ and symmetric radial and dorsalis pedis. No lower extremity edema  Neurologic: Alert and oriented. Thought content appropriate.  GCS: Motor: " 6/Verbal: 5/Eyes: 4 GCS Total: 15  Mental Status: Awake, Alert, Oriented x3  Cranial nerves: face symmetric, tongue midline, CN II-XII grossly intact.   Eyes: pupils equal, round, reactive to light with accomodation, EOMI.   Sensory: intact to light touch throughout  Motor Strength:Moves all extremities spontaneously with good tone.  Full strength upper and lower extremities.  Tremors: Intention tremor bilaterally, severe on left, mild on right  Pulses: 2+ and symmetric radial and dorsalis pedis. No lower extremity edema  Gait: broad-based, cane  Incisions: scalp and chestwall incisions are well-healed    Assessment/Plan:  Problem List Items Addressed This Visit        Neuro    Benign essential tremor - Primary    Overview     The nature of the procedure, as well as its attendant risks, were discussed in detail with the patient.  All questions were answered.  They are agreement with proceeding with surgery as planned, and are tentatively scheduled for revision of right VIM DBS electrode (possibly bilateral) on 6/7/2022.           Relevant Orders    CBC Auto Differential    Comprehensive Metabolic Panel    X-Ray Chest PA And Lateral    EKG 12-lead      Other Visit Diagnoses     Essential tremor        Coagulation defect        Relevant Orders    CBC Auto Differential    APTT    Protime-INR

## 2022-05-30 ENCOUNTER — OFFICE VISIT (OUTPATIENT)
Dept: NEUROSURGERY | Facility: CLINIC | Age: 72
End: 2022-05-30
Payer: OTHER GOVERNMENT

## 2022-05-30 VITALS
HEIGHT: 70 IN | BODY MASS INDEX: 25.91 KG/M2 | WEIGHT: 181 LBS | DIASTOLIC BLOOD PRESSURE: 78 MMHG | SYSTOLIC BLOOD PRESSURE: 120 MMHG | RESPIRATION RATE: 16 BRPM | HEART RATE: 78 BPM

## 2022-05-30 DIAGNOSIS — G25.0 ESSENTIAL TREMOR: ICD-10-CM

## 2022-05-30 DIAGNOSIS — D68.9 COAGULATION DEFECT: ICD-10-CM

## 2022-05-30 DIAGNOSIS — G25.0 BENIGN ESSENTIAL TREMOR: Primary | ICD-10-CM

## 2022-05-30 PROBLEM — C88.0 WALDENSTROM'S MACROGLOBULINEMIA: Status: ACTIVE | Noted: 2022-05-30

## 2022-05-30 PROCEDURE — 99213 PR OFFICE/OUTPT VISIT, EST, LEVL III, 20-29 MIN: ICD-10-PCS | Mod: ,,, | Performed by: NURSE PRACTITIONER

## 2022-05-30 PROCEDURE — 99213 OFFICE O/P EST LOW 20 MIN: CPT | Mod: ,,, | Performed by: NURSE PRACTITIONER

## 2022-06-03 ENCOUNTER — ANESTHESIA EVENT (OUTPATIENT)
Dept: SURGERY | Facility: HOSPITAL | Age: 72
DRG: 027 | End: 2022-06-03
Payer: OTHER GOVERNMENT

## 2022-06-03 ENCOUNTER — HOSPITAL ENCOUNTER (OUTPATIENT)
Dept: RADIOLOGY | Facility: HOSPITAL | Age: 72
Discharge: HOME | End: 2022-06-03
Attending: NURSE PRACTITIONER
Payer: OTHER GOVERNMENT

## 2022-06-03 DIAGNOSIS — G25.0 BENIGN ESSENTIAL TREMOR: ICD-10-CM

## 2022-06-03 PROCEDURE — 71046 X-RAY EXAM CHEST 2 VIEWS: CPT | Mod: TC

## 2022-06-06 DIAGNOSIS — G25.0 BENIGN ESSENTIAL TREMOR: Primary | ICD-10-CM

## 2022-06-06 RX ORDER — MUPIROCIN 20 MG/G
1 OINTMENT TOPICAL 2 TIMES DAILY
Status: CANCELLED | OUTPATIENT
Start: 2022-06-06 | End: 2022-06-07

## 2022-06-07 ENCOUNTER — HOSPITAL ENCOUNTER (INPATIENT)
Facility: HOSPITAL | Age: 72
LOS: 1 days | Discharge: HOME | DRG: 027 | End: 2022-06-08
Attending: NEUROLOGICAL SURGERY | Admitting: NEUROLOGICAL SURGERY
Payer: OTHER GOVERNMENT

## 2022-06-07 ENCOUNTER — ANESTHESIA (OUTPATIENT)
Dept: SURGERY | Facility: HOSPITAL | Age: 72
DRG: 027 | End: 2022-06-07
Payer: OTHER GOVERNMENT

## 2022-06-07 DIAGNOSIS — Z01.818 PRE-OP TESTING: ICD-10-CM

## 2022-06-07 DIAGNOSIS — G25.0 BENIGN ESSENTIAL TREMOR: ICD-10-CM

## 2022-06-07 PROCEDURE — 63600175 PHARM REV CODE 636 W HCPCS: Performed by: NEUROLOGICAL SURGERY

## 2022-06-07 PROCEDURE — 25000003 PHARM REV CODE 250: Performed by: NURSE PRACTITIONER

## 2022-06-07 PROCEDURE — 61867 PR IMPLANT NEUROELECTRODE W/ RECORDING: ICD-10-PCS | Mod: AS,,, | Performed by: NURSE PRACTITIONER

## 2022-06-07 PROCEDURE — 11000001 HC ACUTE MED/SURG PRIVATE ROOM

## 2022-06-07 PROCEDURE — 37000008 HC ANESTHESIA 1ST 15 MINUTES: Performed by: NEUROLOGICAL SURGERY

## 2022-06-07 PROCEDURE — 61867 PR IMPLANT NEUROELECTRODE W/ RECORDING: ICD-10-PCS | Mod: ,,, | Performed by: NEUROLOGICAL SURGERY

## 2022-06-07 PROCEDURE — 61867 IMPLANT NEUROELECTRODE: CPT | Mod: ,,, | Performed by: NEUROLOGICAL SURGERY

## 2022-06-07 PROCEDURE — 63600175 PHARM REV CODE 636 W HCPCS

## 2022-06-07 PROCEDURE — A4216 STERILE WATER/SALINE, 10 ML: HCPCS | Performed by: NURSE ANESTHETIST, CERTIFIED REGISTERED

## 2022-06-07 PROCEDURE — 94761 N-INVAS EAR/PLS OXIMETRY MLT: CPT

## 2022-06-07 PROCEDURE — 36620 INSERTION CATHETER ARTERY: CPT | Performed by: NURSE ANESTHETIST, CERTIFIED REGISTERED

## 2022-06-07 PROCEDURE — 71000033 HC RECOVERY, INTIAL HOUR: Performed by: NEUROLOGICAL SURGERY

## 2022-06-07 PROCEDURE — 25000003 PHARM REV CODE 250: Performed by: NEUROLOGICAL SURGERY

## 2022-06-07 PROCEDURE — 94799 UNLISTED PULMONARY SVC/PX: CPT

## 2022-06-07 PROCEDURE — 61868 PR IMPLANT NEUROELECTRDE, ADDÆL: ICD-10-PCS | Mod: AS,,, | Performed by: NURSE PRACTITIONER

## 2022-06-07 PROCEDURE — 71000039 HC RECOVERY, EACH ADD'L HOUR: Performed by: NEUROLOGICAL SURGERY

## 2022-06-07 PROCEDURE — 25000003 PHARM REV CODE 250: Performed by: NURSE ANESTHETIST, CERTIFIED REGISTERED

## 2022-06-07 PROCEDURE — 61868 IMPLANT NEUROELECTRDE ADDL: CPT | Mod: ,,, | Performed by: NEUROLOGICAL SURGERY

## 2022-06-07 PROCEDURE — 51798 US URINE CAPACITY MEASURE: CPT

## 2022-06-07 PROCEDURE — 27201423 OPTIME MED/SURG SUP & DEVICES STERILE SUPPLY: Performed by: NEUROLOGICAL SURGERY

## 2022-06-07 PROCEDURE — C1713 ANCHOR/SCREW BN/BN,TIS/BN: HCPCS | Performed by: NEUROLOGICAL SURGERY

## 2022-06-07 PROCEDURE — 63600175 PHARM REV CODE 636 W HCPCS: Performed by: NURSE ANESTHETIST, CERTIFIED REGISTERED

## 2022-06-07 PROCEDURE — 36000710: Performed by: NEUROLOGICAL SURGERY

## 2022-06-07 PROCEDURE — 37000009 HC ANESTHESIA EA ADD 15 MINS: Performed by: NEUROLOGICAL SURGERY

## 2022-06-07 PROCEDURE — 99900035 HC TECH TIME PER 15 MIN (STAT)

## 2022-06-07 PROCEDURE — 61868 IMPLANT NEUROELECTRDE ADDL: CPT | Mod: AS,,, | Performed by: NURSE PRACTITIONER

## 2022-06-07 PROCEDURE — 63600175 PHARM REV CODE 636 W HCPCS: Performed by: ANESTHESIOLOGY

## 2022-06-07 PROCEDURE — 27800903 OPTIME MED/SURG SUP & DEVICES OTHER IMPLANTS: Performed by: NEUROLOGICAL SURGERY

## 2022-06-07 PROCEDURE — 36000711: Performed by: NEUROLOGICAL SURGERY

## 2022-06-07 PROCEDURE — C9248 INJ, CLEVIDIPINE BUTYRATE: HCPCS | Mod: JG | Performed by: NURSE ANESTHETIST, CERTIFIED REGISTERED

## 2022-06-07 PROCEDURE — 61868 PR IMPLANT NEUROELECTRDE, ADDÆL: ICD-10-PCS | Mod: ,,, | Performed by: NEUROLOGICAL SURGERY

## 2022-06-07 PROCEDURE — 61867 IMPLANT NEUROELECTRODE: CPT | Mod: AS,,, | Performed by: NURSE PRACTITIONER

## 2022-06-07 PROCEDURE — C1778 LEAD, NEUROSTIMULATOR: HCPCS | Performed by: NEUROLOGICAL SURGERY

## 2022-06-07 RX ORDER — LIDOCAINE HYDROCHLORIDE 10 MG/ML
1 INJECTION, SOLUTION EPIDURAL; INFILTRATION; INTRACAUDAL; PERINEURAL ONCE
Status: CANCELLED | OUTPATIENT
Start: 2022-06-07 | End: 2022-06-07

## 2022-06-07 RX ORDER — CEFAZOLIN SODIUM 2 G/50ML
2 SOLUTION INTRAVENOUS
Status: DISCONTINUED | OUTPATIENT
Start: 2022-06-07 | End: 2022-06-08 | Stop reason: HOSPADM

## 2022-06-07 RX ORDER — ACETAMINOPHEN 325 MG/1
650 TABLET ORAL EVERY 4 HOURS PRN
Status: CANCELLED | OUTPATIENT
Start: 2022-06-07

## 2022-06-07 RX ORDER — HYDROCODONE BITARTRATE AND ACETAMINOPHEN 5; 325 MG/1; MG/1
1 TABLET ORAL EVERY 4 HOURS PRN
Status: DISCONTINUED | OUTPATIENT
Start: 2022-06-07 | End: 2022-06-08 | Stop reason: HOSPADM

## 2022-06-07 RX ORDER — HYDROCODONE BITARTRATE AND ACETAMINOPHEN 5; 325 MG/1; MG/1
1 TABLET ORAL EVERY 4 HOURS PRN
Status: CANCELLED | OUTPATIENT
Start: 2022-06-07

## 2022-06-07 RX ORDER — CEFAZOLIN SODIUM IN 0.9 % NACL 2 G/100 ML
PLASTIC BAG, INJECTION (ML) INTRAVENOUS
Status: DISCONTINUED | OUTPATIENT
Start: 2022-06-07 | End: 2022-06-07

## 2022-06-07 RX ORDER — FENTANYL CITRATE 50 UG/ML
INJECTION, SOLUTION INTRAMUSCULAR; INTRAVENOUS
Status: DISCONTINUED | OUTPATIENT
Start: 2022-06-07 | End: 2022-06-07

## 2022-06-07 RX ORDER — METHOCARBAMOL 100 MG/ML
INJECTION, SOLUTION INTRAMUSCULAR; INTRAVENOUS
Status: COMPLETED
Start: 2022-06-07 | End: 2022-06-07

## 2022-06-07 RX ORDER — LIDOCAINE HYDROCHLORIDE 20 MG/ML
INJECTION, SOLUTION INFILTRATION; PERINEURAL
Status: DISPENSED
Start: 2022-06-07 | End: 2022-06-08

## 2022-06-07 RX ORDER — ACETAMINOPHEN 10 MG/ML
INJECTION, SOLUTION INTRAVENOUS
Status: DISCONTINUED | OUTPATIENT
Start: 2022-06-07 | End: 2022-06-07

## 2022-06-07 RX ORDER — KETOROLAC TROMETHAMINE 30 MG/ML
INJECTION, SOLUTION INTRAMUSCULAR; INTRAVENOUS
Status: COMPLETED
Start: 2022-06-07 | End: 2022-06-07

## 2022-06-07 RX ORDER — MEPERIDINE HYDROCHLORIDE 25 MG/ML
12.5 INJECTION INTRAMUSCULAR; INTRAVENOUS; SUBCUTANEOUS ONCE
Status: ACTIVE | OUTPATIENT
Start: 2022-06-07 | End: 2022-06-08

## 2022-06-07 RX ORDER — PROPOFOL 10 MG/ML
VIAL (ML) INTRAVENOUS
Status: DISCONTINUED | OUTPATIENT
Start: 2022-06-07 | End: 2022-06-07

## 2022-06-07 RX ORDER — DIPHENHYDRAMINE HYDROCHLORIDE 50 MG/ML
25 INJECTION INTRAMUSCULAR; INTRAVENOUS ONCE
Status: COMPLETED | OUTPATIENT
Start: 2022-06-07 | End: 2022-06-07

## 2022-06-07 RX ORDER — SODIUM CHLORIDE, SODIUM GLUCONATE, SODIUM ACETATE, POTASSIUM CHLORIDE AND MAGNESIUM CHLORIDE 30; 37; 368; 526; 502 MG/100ML; MG/100ML; MG/100ML; MG/100ML; MG/100ML
1000 INJECTION, SOLUTION INTRAVENOUS CONTINUOUS
Status: DISCONTINUED | OUTPATIENT
Start: 2022-06-07 | End: 2022-06-08 | Stop reason: HOSPADM

## 2022-06-07 RX ORDER — PERPHENAZINE 16 MG
TABLET ORAL DAILY
COMMUNITY

## 2022-06-07 RX ORDER — EPHEDRINE SULFATE 50 MG/ML
INJECTION, SOLUTION INTRAVENOUS
Status: DISCONTINUED | OUTPATIENT
Start: 2022-06-07 | End: 2022-06-07

## 2022-06-07 RX ORDER — SODIUM CHLORIDE 9 MG/ML
INJECTION, SOLUTION INTRAVENOUS CONTINUOUS
Status: DISCONTINUED | OUTPATIENT
Start: 2022-06-07 | End: 2022-06-08 | Stop reason: HOSPADM

## 2022-06-07 RX ORDER — PROMETHAZINE HYDROCHLORIDE 25 MG/ML
INJECTION, SOLUTION INTRAMUSCULAR; INTRAVENOUS
Status: DISPENSED
Start: 2022-06-07 | End: 2022-06-08

## 2022-06-07 RX ORDER — BACITRACIN 500 [USP'U]/G
OINTMENT TOPICAL
Status: DISCONTINUED | OUTPATIENT
Start: 2022-06-07 | End: 2022-06-07 | Stop reason: HOSPADM

## 2022-06-07 RX ORDER — HYDROMORPHONE HYDROCHLORIDE 2 MG/ML
0.5 INJECTION, SOLUTION INTRAMUSCULAR; INTRAVENOUS; SUBCUTANEOUS EVERY 5 MIN PRN
Status: DISCONTINUED | OUTPATIENT
Start: 2022-06-07 | End: 2022-06-08 | Stop reason: HOSPADM

## 2022-06-07 RX ORDER — ONDANSETRON 4 MG/1
4 TABLET, ORALLY DISINTEGRATING ORAL ONCE
Status: CANCELLED | OUTPATIENT
Start: 2022-06-07 | End: 2022-06-07

## 2022-06-07 RX ORDER — LIDOCAINE HYDROCHLORIDE AND EPINEPHRINE 20; 10 MG/ML; UG/ML
INJECTION, SOLUTION INFILTRATION; PERINEURAL
Status: DISCONTINUED | OUTPATIENT
Start: 2022-06-07 | End: 2022-06-07 | Stop reason: HOSPADM

## 2022-06-07 RX ORDER — BUPIVACAINE HCL/EPINEPHRINE 0.5-1:200K
VIAL (ML) INJECTION
Status: DISCONTINUED | OUTPATIENT
Start: 2022-06-07 | End: 2022-06-07 | Stop reason: HOSPADM

## 2022-06-07 RX ORDER — MUPIROCIN 20 MG/G
1 OINTMENT TOPICAL 2 TIMES DAILY
Status: DISPENSED | OUTPATIENT
Start: 2022-06-07 | End: 2022-06-08

## 2022-06-07 RX ORDER — ONDANSETRON 2 MG/ML
4 INJECTION INTRAMUSCULAR; INTRAVENOUS EVERY 12 HOURS PRN
Status: DISCONTINUED | OUTPATIENT
Start: 2022-06-07 | End: 2022-06-08 | Stop reason: HOSPADM

## 2022-06-07 RX ORDER — ACETAMINOPHEN 325 MG/1
650 TABLET ORAL EVERY 4 HOURS PRN
Status: DISCONTINUED | OUTPATIENT
Start: 2022-06-07 | End: 2022-06-08 | Stop reason: HOSPADM

## 2022-06-07 RX ORDER — ONDANSETRON 2 MG/ML
4 INJECTION INTRAMUSCULAR; INTRAVENOUS DAILY PRN
Status: DISCONTINUED | OUTPATIENT
Start: 2022-06-07 | End: 2022-06-08 | Stop reason: HOSPADM

## 2022-06-07 RX ORDER — LIDOCAINE HYDROCHLORIDE 40 MG/ML
SOLUTION TOPICAL
Status: DISCONTINUED | OUTPATIENT
Start: 2022-06-07 | End: 2022-06-07 | Stop reason: HOSPADM

## 2022-06-07 RX ORDER — FAMOTIDINE 20 MG/1
20 TABLET, FILM COATED ORAL 2 TIMES DAILY
Status: DISCONTINUED | OUTPATIENT
Start: 2022-06-07 | End: 2022-06-08 | Stop reason: HOSPADM

## 2022-06-07 RX ORDER — ONDANSETRON 2 MG/ML
INJECTION INTRAMUSCULAR; INTRAVENOUS
Status: DISCONTINUED | OUTPATIENT
Start: 2022-06-07 | End: 2022-06-07

## 2022-06-07 RX ORDER — LABETALOL HYDROCHLORIDE 5 MG/ML
INJECTION, SOLUTION INTRAVENOUS
Status: DISCONTINUED | OUTPATIENT
Start: 2022-06-07 | End: 2022-06-07

## 2022-06-07 RX ORDER — NAPROXEN SODIUM 220 MG/1
81 TABLET, FILM COATED ORAL DAILY
Status: ON HOLD | COMMUNITY
End: 2022-06-08 | Stop reason: HOSPADM

## 2022-06-07 RX ORDER — SODIUM CHLORIDE, SODIUM GLUCONATE, SODIUM ACETATE, POTASSIUM CHLORIDE AND MAGNESIUM CHLORIDE 30; 37; 368; 526; 502 MG/100ML; MG/100ML; MG/100ML; MG/100ML; MG/100ML
1000 INJECTION, SOLUTION INTRAVENOUS CONTINUOUS
Status: CANCELLED | OUTPATIENT
Start: 2022-06-07 | End: 2022-07-07

## 2022-06-07 RX ORDER — LIDOCAINE HYDROCHLORIDE 40 MG/ML
INJECTION, SOLUTION RETROBULBAR
Status: DISPENSED
Start: 2022-06-07 | End: 2022-06-07

## 2022-06-07 RX ORDER — HYDROCODONE BITARTRATE AND ACETAMINOPHEN 10; 325 MG/1; MG/1
1 TABLET ORAL EVERY 4 HOURS PRN
Status: DISCONTINUED | OUTPATIENT
Start: 2022-06-07 | End: 2022-06-08 | Stop reason: HOSPADM

## 2022-06-07 RX ADMIN — HYDROMORPHONE HYDROCHLORIDE 0.5 MG: 2 INJECTION, SOLUTION INTRAMUSCULAR; INTRAVENOUS; SUBCUTANEOUS at 01:06

## 2022-06-07 RX ADMIN — EPHEDRINE SULFATE 10 MG: 50 INJECTION INTRAVENOUS at 11:06

## 2022-06-07 RX ADMIN — ACETAMINOPHEN 1000 MG: 10 INJECTION, SOLUTION INTRAVENOUS at 12:06

## 2022-06-07 RX ADMIN — ONDANSETRON 4 MG: 2 INJECTION INTRAMUSCULAR; INTRAVENOUS at 12:06

## 2022-06-07 RX ADMIN — SODIUM CHLORIDE, SODIUM GLUCONATE, SODIUM ACETATE, POTASSIUM CHLORIDE AND MAGNESIUM CHLORIDE: 526; 502; 368; 37; 30 INJECTION, SOLUTION INTRAVENOUS at 07:06

## 2022-06-07 RX ADMIN — HYDROCODONE BITARTRATE AND ACETAMINOPHEN 1 TABLET: 10; 325 TABLET ORAL at 03:06

## 2022-06-07 RX ADMIN — PROPOFOL 30 MG: 10 INJECTION, EMULSION INTRAVENOUS at 12:06

## 2022-06-07 RX ADMIN — EPHEDRINE SULFATE 10 MG: 50 INJECTION INTRAVENOUS at 12:06

## 2022-06-07 RX ADMIN — DEXMEDETOMIDINE HYDROCHLORIDE 0.2 MCG/KG/HR: 100 INJECTION, SOLUTION INTRAVENOUS at 07:06

## 2022-06-07 RX ADMIN — EPHEDRINE SULFATE 5 MG: 50 INJECTION INTRAVENOUS at 11:06

## 2022-06-07 RX ADMIN — METHOCARBAMOL 1000 MG: 100 INJECTION, SOLUTION INTRAMUSCULAR; INTRAVENOUS at 01:06

## 2022-06-07 RX ADMIN — HYDROMORPHONE HYDROCHLORIDE 0.5 MG: 2 INJECTION, SOLUTION INTRAMUSCULAR; INTRAVENOUS; SUBCUTANEOUS at 02:06

## 2022-06-07 RX ADMIN — Medication 10 MG: at 07:06

## 2022-06-07 RX ADMIN — Medication 2 G: at 12:06

## 2022-06-07 RX ADMIN — KETOROLAC TROMETHAMINE 30 MG: 30 INJECTION, SOLUTION INTRAMUSCULAR at 03:06

## 2022-06-07 RX ADMIN — SODIUM CHLORIDE: 9 INJECTION, SOLUTION INTRAVENOUS at 10:06

## 2022-06-07 RX ADMIN — FENTANYL CITRATE 25 MCG: 50 INJECTION, SOLUTION INTRAMUSCULAR; INTRAVENOUS at 12:06

## 2022-06-07 RX ADMIN — KETOROLAC TROMETHAMINE 30 MG: 30 INJECTION, SOLUTION INTRAMUSCULAR at 01:06

## 2022-06-07 RX ADMIN — Medication 2 G: at 08:06

## 2022-06-07 RX ADMIN — FENTANYL CITRATE 25 MCG: 50 INJECTION, SOLUTION INTRAMUSCULAR; INTRAVENOUS at 07:06

## 2022-06-07 RX ADMIN — ACETAMINOPHEN 650 MG: 325 TABLET ORAL at 10:06

## 2022-06-07 RX ADMIN — EPHEDRINE SULFATE 10 MG: 50 INJECTION INTRAVENOUS at 09:06

## 2022-06-07 RX ADMIN — CLEVIPIDINE 1 MG/HR: 0.5 EMULSION INTRAVENOUS at 07:06

## 2022-06-07 RX ADMIN — MUPIROCIN 1 G: 20 OINTMENT TOPICAL at 10:06

## 2022-06-07 RX ADMIN — DIPHENHYDRAMINE HYDROCHLORIDE 25 MG: 50 INJECTION, SOLUTION INTRAMUSCULAR; INTRAVENOUS at 03:06

## 2022-06-07 RX ADMIN — FAMOTIDINE 20 MG: 20 TABLET, FILM COATED ORAL at 10:06

## 2022-06-07 RX ADMIN — HYDROMORPHONE HYDROCHLORIDE 0.5 MG: 2 INJECTION, SOLUTION INTRAMUSCULAR; INTRAVENOUS; SUBCUTANEOUS at 03:06

## 2022-06-07 RX ADMIN — EPHEDRINE SULFATE 15 MG: 50 INJECTION INTRAVENOUS at 12:06

## 2022-06-07 NOTE — ANESTHESIA RELEASE NOTE
"Anesthesia Release from PACU Note    Patient: Chandu Sosa    Procedure(s) Performed: Procedure(s) (LRB):  INSERTION, DEEP BRAIN STIMULATOR (N/A)    Anesthesia type: general    Post pain: Adequate analgesia    Post assessment: no apparent anesthetic complications    Last Vitals:   Visit Vitals  /67   Pulse 67   Temp 36.6 °C (97.9 °F)   Resp 16   Ht 5' 11" (1.803 m)   Wt 79.8 kg (175 lb 14.8 oz)   SpO2 96%   BMI 24.54 kg/m²       Post vital signs: stable    Level of consciousness: awake, alert  and oriented    Nausea/Vomiting: no nausea/no vomiting    Complications: none    Airway Patency: patent    Respiratory: unassisted    Cardiovascular: stable and blood pressure at baseline    Hydration: euvolemic  "

## 2022-06-07 NOTE — BRIEF OP NOTE
[facility] - Periop Services  Brief Operative Note    SUMMARY     Surgery Date: 6/7/2022     Surgeon(s) and Role:     * Kieran Saenz MD - Primary    Assistant: Nellie Grant NP    Pre-op Diagnosis:  * No pre-op diagnosis entered *    Post-op Diagnosis:  Post-Op Diagnosis Codes:     * Benign essential tremor [G25.0]    Procedure(s) (LRB):  INSERTION, DEEP BRAIN STIMULATOR (N/A)     Revision of bilateral VIM DBS electrode (removal of electrodes and implantation of bilateral VIM directional electrodes)    Anesthesia: Local MAC    Operative Findings: Patient tolerated procedure well and was transferred to PACU.     Estimated Blood Loss: 25ml    Estimated Blood Loss has been documented.         Specimens:   Specimen (24h ago, onward)             Start     Ordered    06/07/22 1052  Specimen to Pathology  RELEASE UPON ORDERING        References:    Click here for ordering Quick Tip   Question:  Release to patient  Answer:  Immediate    06/07/22 9986                [id number]

## 2022-06-07 NOTE — OP NOTE
DATE OF OPERATION:   June 7, 2022    PREOPERATIVE DIAGNOSIS:   1. Medically intractable essential tremor     POSTOPERATIVE DIAGNOSIS:   1. Medically intractable essential tremor     SURGEON:  Kieran Saenz M.D.    ASSISTANT: Nellie MORENO     PROCEDURE:   1.  Removal and replacement of bilateral  Vim DBS electrode s with microelectrode recording (right side only), Stage 1 of 2  2.  Intraoperative neuro navigation with the [employer] O-arm neuronavigation system     ANESTHESIA:   Local with IV sedation     BLOOD LOSS:   Minimal     SPECIMEN(s):   Explanted cranial electrodes    COMPLICATIONS:   None     HISTORY:   Chandu Munoz is a 71-year-old gentleman who in 2013 underwent bilateral the IM DBS electrode and IPG implantation to [facility].  The right brain electrode was not functioning nearly as well as the left-sided electrode and, as a result, in 2015 he underwent revision of the right-sided electrode.  He still had continued with poor tremor control on the left.  On the right side his tremor control initially was good, but that has declined somewhat over the last year so.  Options were discussed and it was felt that he would benefit from revision of both electrodes with conversion to directional electrodes and a new extension/IPG system with the Percept IPG.  The patient understood and accepted the nature of the surgery as well as its attendant    FINDINGS:   There were no untoward findings.  On the right side, 3 simultaneous microelectrode recording tracks were utilized; Center, posterior and lateral.  The posterior and lateral tracks were quiet.  Semi microelectrode stimulation was carried out 5 mm above and 1 mm above target.  There was probably 80-90% tremor control the left upper extremity.  The final Medtronic 3387 DBS electrode was placed through the center tract with the tip at target. On the right side, the final Medtronic 3387 DBS electrode was placed through the center tract  with tip at target.  The patient tolerated the procedure well.     PROCEDURE IN DETAIL:   Prior to surgery the patient underwent stereotactic MRI and CT scanning.  These data sets were imported into the Stealth neuro-navigation system and a 3-D model was built.  Scans were merged with excellent anatomic correlation.  Anterior and posterior commissures were identified and then coordinates chosen with a hybrid of direct targeting, especially with an existing contralateral electrode, and indirect targeting using common formula.  An entry point was chosen to optimize the trajectory through gyrus, avoiding sulci and the ventricle as well as any vasculature.     The patient was then brought to the operating room and received intravenous antibiotics.  This was repeated at four hours, if the patient was still in the operating theatre.  The patient had not taken any of his/her anti-Parkinson or tremor-suppressive medications.  The head was positioned in the noninvasive head ramirez with the patient in a beachchair type position but optimized to fit within the confines of the O arm.  The noninvasive reference arc with suction cups was affixed to the patient's forehead.  A spin with the O arm was then taken and this data set was imported into the [employer] computer with the image data being merged with the previous studies. Again, excellent anatomic correlation was noted.  The previous incisions were marked out.  The head was prepped and draped in the usual fashion, allowing for complete exposure of his/her face, as well as his upper and lower extremities for intraoperative neurophysiologic testing.     The scalp incision was then infiltrated with local anesthetic containing epinephrine.  The incision was carried down through skin and subcutaneous tissues with a knife.  A self-retaining retractor was placed and scar tissue was freed away from the existing bur hole cover and base.  The old electrode was divided and then the  "cranial portion of the electrode removed.  Because of the poor functioning of the right-sided electrode a in because the current tip was medial and posterior to the ideal location, a new bur hole was created posterolateral to the original 1.  The air-driven  was then used to enter the cranium, and an attempt was made to reproduce the planned electrode trajectory.  The small lip of inner table was removed with Kerrison rongeurs to maximize the exposure.     The dura was then opened in a cruciate fashion.  The dural leaves were then cauterized with bipolar cautery to again allow for maximal exposure.  The IGN Navigus cranial base was then affixed securely to the skull around the bur hole with the supplied self-drilling screws.  Then the exposed cortex was cauterized slightly with bipolar cautery and a small opening made in the pial surface to allow for atraumatic insertion of the microelectrode cannula.     The [employer] platform was then secured to the skull over the bur hole.  The remainder of the platform was then assembled and a reference arc attached to the platform.  The entry point was reset and the Stealth guidance system was used to match the planned trajectory and locked into place.  The head stage system was then attached securely to the platform and the micro-drive was "zeroed".  The drive was then set to a depth of 10 mm above target, and this had been calculated previously from the targeting plan.  The microelectrode cannula, with its stylet in place, was then inserted to its appropriate depth.  After removing the stylet, the microelectrode system was inserted into the cannula.  The exposed tip of the microelectrode was retracted into the insulated housing to protect it during its insertion.  Then attachments were made to the microelectrode recording system.  The microelectrode was advanced in 0.5-1.0 mm increments using the micro-drive and allowing time for neurons in the recording " region to return to baseline level.  Please refer to the FINDINGS SECTION for details of the microelectrode recording, semi-micro stimulation, and macro-stimulation with the implanted electrode for this particular patient.     At the completion of the microelectrode recording session, the microelectrode system was removed.  After placing the cannula and guide tube for the final electrode, the remaining microelectrode systems were removed and then an additional adaptor was used to hold the implanted electrode.  The electrode was measured to the appropriate length, and this was calculated independently at least three times.  A depth stop was secured around the electrode, and then it was inserted to the target depth as determined by the functional subcortical mapping described above.     Test macro-stimulation was then carried out, as described above, and then, once positioning of the electrode was accepted, the head stage was dismantled and the stylet of the electrode was removed.  The electrode was then secured within the Navigus base and the cap was placed over the Navigus cranial base.  The end of the electrode was secured to the proximal end of a temporary externalization testing electroe for protection and the boot was secured over this connection.  The [employer] platform with its attached reference array was removed and the wound covered with an antibiotic soaked sponge.    The contralateral incision was then infiltrated with local anesthetic containing epinephrine and the incision was opened in the same fashion.  The existing cover and base ring were then removed in the left-sided brain electrode was divided and then removed from the head.  A new bur hole was created slightly posterolateral to the previous entry and prepared in the same fashion.  New cranial base was affixed to the skull and then the base of the Nexframe guide was secured to the skull around the base ring.  The reference frame was attached to  the Nexframe and then a spin with the O arm was carried out.  It should be mentioned that the right-sided electrode was expected position along the planned tract.  The Stealth navigation system was used to match the planned trajectory for this side and then locked into place.     Because of the decent functioning of the left-sided electrode, we targeted the tip of the electrode.  The entry point was slightly posterolateral.  The center tract was used to place the Medtronic 3387 DBS electrode and test stimulation was carried out with 100% tremor control on the right.  The system was removed and the new locking caps placed..     Once both electrodes had been secured and the ends covered with the temporary connectors, conscious sedation was used to allow the tunneling to the left retro-auricular area.  A 0 silk suture was tied around the cap over the right-sided electrode.  The electrode contralateral to the side of final IPG placement was then tunneled in the subgaleal plane towards the wound ipsilateral to the IPG location.  Both electrodes were then tunneled in the subgaleal plane as far down as possible in the retroauricular area.  The wounds were irrigated copiously with antibiotic irrigation and then closed with 2-0 Vicryl for the galea and staples for the skin edges. The patient was then taken to the postanesthetic care unit in satisfactory condition with correct sponge and needle counts.

## 2022-06-07 NOTE — TRANSFER OF CARE
"Anesthesia Transfer of Care Note    Patient: Chandu Sosa    Procedure(s) Performed: Procedure(s) (LRB):  INSERTION, DEEP BRAIN STIMULATOR (N/A)    Patient location: PACU    Anesthesia Type: MAC    Transport from OR: Transported from OR on room air with adequate spontaneous ventilation    Post pain: adequate analgesia    Post assessment: no apparent anesthetic complications    Post vital signs: stable    Level of consciousness: awake and oriented    Nausea/Vomiting: no nausea/vomiting    Complications: none    Transfer of care protocol was followed      Last vitals:   Visit Vitals  /73 (BP Location: Left arm)   Pulse 67   Temp 37.1 °C (98.7 °F) (Oral)   Resp 20   Ht 5' 11" (1.803 m)   Wt 79.8 kg (175 lb 14.8 oz)   SpO2 98%   BMI 24.54 kg/m²     "

## 2022-06-07 NOTE — ANESTHESIA PREPROCEDURE EVALUATION
06/07/2022  Chandu Sosa is a 71 y.o., male.  INSERTION, DEEP BRAIN STIMULATOR (N/A )  Pre Proc Com, [facility] OR 11      Pre-op Assessment    I have reviewed the Patient Summary Reports.     I have reviewed the Nursing Notes. I have reviewed the NPO Status.   I have reviewed the Medications.     Review of Systems  Anesthesia Hx:  No problems with previous Anesthesia    Social:  Former Smoker    Hematology/Oncology:  Hematology Normal   Oncology Normal     EENT/Dental:EENT/Dental Normal   Cardiovascular:   Exercise tolerance: good Denies Hypertension.  Denies Dysrhythmias.   Denies Angina.  Denies Orthopnea.  Functional Capacity good / => 4 METS    Pulmonary:  Pulmonary Normal  Denies Shortness of breath.    Renal/:   Denies Chronic Renal Disease.     Hepatic/GI:  Hepatic/GI Normal    Musculoskeletal:  Musculoskeletal Normal    Endocrine:  Endocrine Normal  Denies Morbid Obesity / BMI > 40  Dermatological:  Skin Normal    Psych:  Psychiatric Normal           Physical Exam  General: Alert, Oriented, Well nourished and Cooperative    Airway:  Mallampati: I   Mouth Opening: Normal  TM Distance: Normal  Tongue: Normal  Neck ROM: Normal ROM    Dental:  Intact    Chest/Lungs:  Clear to auscultation, Normal Respiratory Rate    Heart:  Rate: Normal  Rhythm: Regular Rhythm        Anesthesia Plan  Type of Anesthesia, risks & benefits discussed:    Anesthesia Type: MAC  Intra-op Monitoring Plan: Standard ASA Monitors  Post Op Pain Control Plan: multimodal analgesia  Induction:  IV  Airway Plan: Direct  Informed Consent: Informed consent signed with the Patient and all parties understand the risks and agree with anesthesia plan.  All questions answered. Patient consented to blood products? Yes  ASA Score: 3  Day of Surgery Review of History & Physical: H&P Update referred to the surgeon/provider.  Anesthesia Plan  ACTIVITY LEVEL:  If you received sedation or an anesthetic, you may feel sleepy for several hours. Rest until you are more awake. Gradually resume your normal activities. Normal activity will cause no undue risk to your eye. The white part of your eye might be red - this is nothing to worry about. Wear your old glasses or sunglasses that were given to you for protection during the day.    RESTRICTIONS - for the next 7 days  · Do not lift anything over 10 pounds.  · When bending, bend at the knees not the waist.  · Do not rub the eye.  · Do not get water in the eye.  · Do not sleep on the side that had surgery.  · Protect your eye at bedtime with the shield provided.    DIET: At home, continue with liquids. If there is no nausea, you may eat a soft diet and gradually resume your normal diet. Limit alcohol intake for 24 hours.    BATHING: You may shower or bathe as normal. You may take a warm wash cloth, which has been wrung out to remove excess water, and gently remove crusting on the lashes.    MEDICATIONS: You may take Extra Strength Tylenol every 4 hours for pain/headache.     Use your drops     Today: [place]-  12noon,   4pm    8pm     [place] - 12noon,  4pm   8pm     [place]- 12noon    Tomorrow:  Resume pink and beige cap drops four times a day.  Resume grey cap drops once a day.    Place one drop in the eye that had surgery from the first bottle. Wait 5 minutes and then use the second bottle. (It does not matter which bottle is used first.) CONTINUE all glaucoma drops.   No driving, alcoholic beverages or signing legal documents for next 24 hours or while taking pain medication      WHEN TO CALL THE DOCTOR:  · Redness that increases significantly  · Pain not relieved by Tylenol  RETURN APPOINTMENT:  You will need to see Dr. Okeefe on Friday at the [facility].   . Bring your eye kit with you on your follow-up visit. Your kit contains sunglasses, eye shield, tape and your eye drops.  FOR EMERGENCIES:  If any  Notes: TIVA , SEDATION     Ready For Surgery From Anesthesia Perspective.    Narrative & Impression  EXAMINATION:  XR CHEST PA AND LATERAL     CLINICAL HISTORY:  , Essential tremor.     FINDINGS:  No alveolar consolidation, effusion, or pneumothorax is seen.   The thoracic aorta is normal  cardiac silhouette, central pulmonary vessels and mediastinum are normal in size and are grossly unremarkable.   visualized osseous structures are grossly unremarkable.  .       unusual problems or difficulties occur, contact Dr. Okeefe at the Clinic office at [phone] during normal business hours. If after hours, call [phone].    Fall Prevention  Millions of people fall every year and injure themselves. You may have had anesthesia or sedation which may increase your risk of falling. You may have health issues that put you at an increased risk of falling.     Here are ways to reduce your risk of falling.  ·   · Make your home safe by keeping walkways clear of objects you may trip over.  · Use non-slip pads under rugs. Do not use area rugs or small throw rugs.  · Use non-slip mats in bathtubs and showers.  · Install handrails and lights on staircases.  · Do not walk in poorly lit areas.  · Do not stand on chairs or wobbly ladders.  · Use caution when reaching overhead or looking upward. This position can cause a loss of balance.  · Be sure your shoes fit properly, have non-slip bottoms and are in good condition.   · Wear shoes both inside and out. Avoid going barefoot or wearing slippers.  · Be cautious when going up and down stairs, curbs, and when walking on uneven sidewalks.  · If your balance is poor, consider using a cane or walker.  · If your fall was related to alcohol use, stop or limit alcohol intake.   · If your fall was related to use of sleeping medicines, talk to your doctor about this. You may need to reduce your dosage at bedtime if you awaken during the night to go to the bathroom.    · To reduce the need for nighttime bathroom trips:  ¨ Avoid drinking fluids for several hours before going to bed  ¨ Empty your bladder before going to bed  · Stay as active as you can. Balance, flexibility, strength, and endurance all come from exercise. They all play a role in preventing falls. Ask your healthcare provider which types of activity are right for you.  · Get your vision checked on a regular basis.  · If you have pets, know where they are before you stand up or  walk so you don't trip over them.  · Use night lights.

## 2022-06-07 NOTE — ANESTHESIA PROCEDURE NOTES
Arterial    Diagnosis: Benign essential tremor    Patient location during procedure: pre-op  Procedure start time: 6/7/2022 7:15 AM  Timeout: 6/7/2022 7:14 AM  Procedure end time: 6/7/2022 7:16 AM    Staffing  Authorizing Provider: Adiel Coleman MD  Performing Provider: Yovanny Jacob CRNA    Anesthesiologist was present at the time of the procedure.    Preanesthetic Checklist  Completed: patient identified, IV checked, site marked, risks and benefits discussed, surgical consent, monitors and equipment checked, pre-op evaluation, timeout performed and anesthesia consent givenArterial  Skin Prep: chlorhexidine gluconate  Orientation: right  Location: radial    Catheter Size: 20 G  Catheter placement by Anatomical landmarks. Heme positive aspiration all ports. Insertion Attempts: 1  Assessment  Dressing: secured with tape and tegaderm  Patient: Tolerated well

## 2022-06-08 VITALS
SYSTOLIC BLOOD PRESSURE: 135 MMHG | TEMPERATURE: 100 F | BODY MASS INDEX: 24.63 KG/M2 | RESPIRATION RATE: 18 BRPM | DIASTOLIC BLOOD PRESSURE: 68 MMHG | WEIGHT: 175.94 LBS | HEIGHT: 71 IN | OXYGEN SATURATION: 96 % | HEART RATE: 82 BPM

## 2022-06-08 LAB
ANION GAP SERPL CALC-SCNC: 8 MEQ/L
BASOPHILS # BLD AUTO: 0.05 X10(3)/MCL (ref 0–0.2)
BASOPHILS NFR BLD AUTO: 0.5 %
BUN SERPL-MCNC: 9.1 MG/DL (ref 8.4–25.7)
CALCIUM SERPL-MCNC: 8.8 MG/DL (ref 8.8–10)
CHLORIDE SERPL-SCNC: 106 MMOL/L (ref 98–107)
CO2 SERPL-SCNC: 23 MMOL/L (ref 23–31)
CREAT SERPL-MCNC: 0.72 MG/DL (ref 0.73–1.18)
CREAT/UREA NIT SERPL: 13
EOSINOPHIL # BLD AUTO: 0.11 X10(3)/MCL (ref 0–0.9)
EOSINOPHIL NFR BLD AUTO: 1.2 %
ERYTHROCYTE [DISTWIDTH] IN BLOOD BY AUTOMATED COUNT: 13.1 % (ref 11.5–17)
GLUCOSE SERPL-MCNC: 103 MG/DL (ref 82–115)
HCT VFR BLD AUTO: 38.6 % (ref 42–52)
HGB BLD-MCNC: 12.6 GM/DL (ref 14–18)
IMM GRANULOCYTES # BLD AUTO: 0.05 X10(3)/MCL (ref 0–0.02)
IMM GRANULOCYTES NFR BLD AUTO: 0.5 % (ref 0–0.43)
LYMPHOCYTES # BLD AUTO: 1.12 X10(3)/MCL (ref 0.6–4.6)
LYMPHOCYTES NFR BLD AUTO: 11.8 %
MCH RBC QN AUTO: 32.5 PG (ref 27–31)
MCHC RBC AUTO-ENTMCNC: 32.6 MG/DL (ref 33–36)
MCV RBC AUTO: 99.5 FL (ref 80–94)
MONOCYTES # BLD AUTO: 1.2 X10(3)/MCL (ref 0.1–1.3)
MONOCYTES NFR BLD AUTO: 12.6 %
NEUTROPHILS # BLD AUTO: 7 X10(3)/MCL (ref 2.1–9.2)
NEUTROPHILS NFR BLD AUTO: 73.4 %
NRBC BLD AUTO-RTO: 0 %
PLATELET # BLD AUTO: 259 X10(3)/MCL (ref 130–400)
PMV BLD AUTO: 9.8 FL (ref 9.4–12.4)
POTASSIUM SERPL-SCNC: 4.7 MMOL/L (ref 3.5–5.1)
RBC # BLD AUTO: 3.88 X10(6)/MCL (ref 4.7–6.1)
SODIUM SERPL-SCNC: 137 MMOL/L (ref 136–145)
WBC # SPEC AUTO: 9.5 X10(3)/MCL (ref 4.5–11.5)

## 2022-06-08 PROCEDURE — 51798 US URINE CAPACITY MEASURE: CPT

## 2022-06-08 PROCEDURE — 36415 COLL VENOUS BLD VENIPUNCTURE: CPT | Performed by: NURSE PRACTITIONER

## 2022-06-08 PROCEDURE — 99024 POSTOP FOLLOW-UP VISIT: CPT | Mod: ,,, | Performed by: NURSE PRACTITIONER

## 2022-06-08 PROCEDURE — 94799 UNLISTED PULMONARY SVC/PX: CPT

## 2022-06-08 PROCEDURE — 51701 INSERT BLADDER CATHETER: CPT

## 2022-06-08 PROCEDURE — 97166 OT EVAL MOD COMPLEX 45 MIN: CPT

## 2022-06-08 PROCEDURE — 97162 PT EVAL MOD COMPLEX 30 MIN: CPT

## 2022-06-08 PROCEDURE — 99024 PR POST-OP FOLLOW-UP VISIT: ICD-10-PCS | Mod: ,,, | Performed by: NURSE PRACTITIONER

## 2022-06-08 PROCEDURE — 25000003 PHARM REV CODE 250: Performed by: NURSE PRACTITIONER

## 2022-06-08 PROCEDURE — 80048 BASIC METABOLIC PNL TOTAL CA: CPT | Performed by: NURSE PRACTITIONER

## 2022-06-08 PROCEDURE — 99900031 HC PATIENT EDUCATION (STAT)

## 2022-06-08 PROCEDURE — 63600175 PHARM REV CODE 636 W HCPCS: Performed by: NURSE PRACTITIONER

## 2022-06-08 PROCEDURE — 85025 COMPLETE CBC W/AUTO DIFF WBC: CPT | Performed by: NURSE PRACTITIONER

## 2022-06-08 PROCEDURE — 63600175 PHARM REV CODE 636 W HCPCS: Performed by: ANESTHESIOLOGY

## 2022-06-08 RX ORDER — TAMSULOSIN HYDROCHLORIDE 0.4 MG/1
0.8 CAPSULE ORAL DAILY
Status: DISCONTINUED | OUTPATIENT
Start: 2022-06-08 | End: 2022-06-08 | Stop reason: HOSPADM

## 2022-06-08 RX ORDER — TAMSULOSIN HYDROCHLORIDE 0.4 MG/1
0.8 CAPSULE ORAL NIGHTLY
Qty: 60 CAPSULE | Refills: 11 | Status: SHIPPED | OUTPATIENT
Start: 2022-06-08 | End: 2022-07-21

## 2022-06-08 RX ORDER — HYDROCODONE BITARTRATE AND ACETAMINOPHEN 5; 325 MG/1; MG/1
1 TABLET ORAL EVERY 4 HOURS PRN
Qty: 42 TABLET | Refills: 0 | Status: ON HOLD | OUTPATIENT
Start: 2022-06-08 | End: 2022-06-21

## 2022-06-08 RX ADMIN — HYDROCODONE BITARTRATE AND ACETAMINOPHEN 1 TABLET: 10; 325 TABLET ORAL at 04:06

## 2022-06-08 RX ADMIN — FAMOTIDINE 20 MG: 20 TABLET, FILM COATED ORAL at 07:06

## 2022-06-08 RX ADMIN — TAMSULOSIN HYDROCHLORIDE 0.8 MG: 0.4 CAPSULE ORAL at 10:06

## 2022-06-08 RX ADMIN — ONDANSETRON 4 MG: 2 INJECTION INTRAMUSCULAR; INTRAVENOUS at 10:06

## 2022-06-08 NOTE — DISCHARGE INSTRUCTIONS
Keep incisions dressed until Friday. On Friday, Ok to shower and leave incisions open to air. Do not submerge under water. D/c home with indwelling bennett catheter. F/u with Urology outpatient in one week for voiding trial.

## 2022-06-08 NOTE — NURSING
PATIENT AND WIFE UNDERSTAND DISCHARGE TEACHING, AND UNDERSTAND THE IMPORTANCE OF FOLLOW UP APPOINTMENTS. PATIENT ID LEAVING WITH BUSH IN PLACE, EDUCATION WAS GIVEN. IV WAS REMOVED WITHOUT COMPLICATIONS. PATIENT STABLE UPON DISCHARGE

## 2022-06-08 NOTE — PLAN OF CARE
Therapy updates  that pt needs walker and outpt PT.   Spoke with pt and wife. They have no preference for DME and would like therapy at [facility] or [facility]. They live in [place] and confirm [place] or [place] locations are fine.   Wife at bedside and will assist pt as needed   Pt has BC and also Vet  Will use BC to get the above which is fine with pt.   Pt and wife deny any additional needs

## 2022-06-08 NOTE — HOSPITAL COURSE
Patient tolerated the procedure well and was admitted to the post-op floor from PACU. Incisional pain was controlled with oral medications. Tolerating oral intake. Tremors and mobility at baseline. He was experiencing urinary retention post-operatively, requiring in and out catheterization. Indwelling bennett was subsequently placed. He was started on Flomax this morning. Bennett was clamped, patient did not have sensation of full bladder. Discussed with Maryjo Urology NP. Patient will be discharged home with indwelling bennett and will f/u outpatient with urology in one week. His staples will be removed at 10 days post-op. Rx for Luther sent to pharmacy downstairs.

## 2022-06-08 NOTE — PT/OT/SLP EVAL
"Occupational Therapy   Evaluation    Name: Chandu Sosa  MRN: [id number]  Admitting Diagnosis:  Benign essential tremor, s/p DBS insertion, s/p intraoperative neuro navigation   Recent Surgery: Procedure(s) (LRB):  INSERTION, DEEP BRAIN STIMULATOR (N/A) 1 Day Post-Op    Recommendations:     Discharge Recommendations: Home with home health vs OP       Assessment:     Chandu Sosa is a 71 y.o. male with a medical diagnosis of Benign essential tremor,  s/p DBS insertion, s/p intraoperative neuro navigation. Performance deficits affecting function: impaired functional mobilty, impaired self care skills, gait instability, decreased upper extremity function, impaired fine motor, impaired cognition.      Rehab Prognosis: Good; patient would benefit from acute skilled OT services to address these deficits and reach maximum level of function.       Plan:     Patient to be seen 5 x/week, daily to address the above listed problems via self-care/home management  Plan of Care Expires: 06/22/22  Plan of Care Reviewed with: patient, spouse    Subjective     Chief Complaint: None reported   Patient/Family Comments/goals: "To go home"    Occupational Profile:  Living Environment: Pt lives at home with his wife in a one story home with no stairs. Pt has a walk in shower with suction grab bars but reports that they are considering installing grab bars in the bathroom. Pt reports that they have a toilet with a high height and a bidet  Previous level of function: Pt reports that his has trouble with buttoning up his shirts. Pt states that his wife assists him with all ADLs/IADLs (feeding, container management as needed) due to intentional tremors. Pt expressed that he tries to do as much as can on his own with UB dressing, but his wife will assist if he needs it. Hx of falling in garden.  Utilizes cane at baseline.  Equipment Used at Home:  cane, straight, wheelchair, walker, rolling  Assistance upon Discharge: Pt will have " assistance from wife at d/c.     Pain/Comfort:  No pain reported, but pt stated that he did not feel comfortable.     Patients cultural, spiritual, Buddhism conflicts given the current situation: no    Objective:     Communicated with: RN prior to session.  Patient found supine with bennett catheter (DBS) upon OT entry to room.    General Precautions: Standard, fall, DBS insertion   Respiratory Status: Room air    Occupational Performance:    Bed Mobility:    Patient completed Supine to Sit with stand by assistance    Functional Mobility/Transfers:  Patient completed Sit <> Stand Transfer with minimum assistance  with  rolling walker   Patient completed Toilet Transfer Step Transfer technique with minimum assistance with  rolling walker  Functional Mobility: Pt able to perform sit to stand with minimum assistance and RW for support. Pt able to perform toilet transfer with minimal assistance and RW. Pt observed ambulating to chair with caution and appropriate speed with RW and min A. Utilizes bidet at home.    Activities of Daily Living:  Feeding:  maximal assistance pt required max assistance when drinking water due to intentional tremors  Toileting: minimum assistance pt able to perform toileting transfer with min assist    Cognitive/Visual Perceptual:  Cognitive/Psychosocial Skills:     -       Oriented to: Person, Place, Time and Situation   -       Safety awareness/insight to disability: intact   -       Mood/Affect/Coping skills/emotional control: Appropriate to situation, Cooperative and Pleasant    Physical Exam:  Upper Extremity Strength:    -       Right Upper Extremity: WFL, but limited by intentional tremors  -       Left Upper Extremity: WFL, but limited by intentional tremors. Reports that tremors are more prominent on LUE.       Treatment & Education:  OT educated pt and pts wife on POC. Pt and wife verbalized agreement. Pt stated that he was not comfortable and overwhelmed with surgical procedure. Pt  able to perform bed mobility and toileting transfer with use RW. Coordination was assessed and observed to be impaired due to limitations with intentional tremors. Pts wife states that she will be available to take care of him upon d/c. Pt communicated that the DBS would not be turned on until 3 weeks from today.  Recommend use of RW.  Gait belt provided for wife.      Patient left up in chair with all lines intact and call button in reach    GOALS:   Multidisciplinary Problems       Occupational Therapy Goals          Problem: Occupational Therapy    Goal Priority Disciplines Outcome Interventions   Occupational Therapy Goal     OT, PT/OT Ongoing, Progressing    Description: Goals to be met by: 6/22/22    Patient will increase functional independence with ADLs by performing:    Feeding with Moderate Assistance with appropriate AD.  UE Dressing with Minimal Assistance.  Grooming while seated at sink with Moderate A.  Toileting from toilet with Mod A for hygiene and clothing management.   Toilet transfer to toilet with CGA.                         History:     Past Medical History:   Diagnosis Date    AA (alcohol abuse)     in past    Arthritis     Cancer     Essential tremor     [place] (hard of hearing)     Sleep apnea     does not use CPAP    Waldenstrom's macroglobulinemia        Past Surgical History:   Procedure Laterality Date    APPENDECTOMY      Bilateral VIM DBSand left chest IPG  07/02/2013    Dr. Francisco    CATARACT EXTRACTION Bilateral     COLONOSCOPY      RETINAL DETACHMENT SURGERY Bilateral     revision of right VIM DBS lead Right 09/15/2015    Dr. Francisco    TONSILLECTOMY  1959    TUMOR EXCISION  2008       Time Tracking:     OT Date of Treatment: 06/08/22  OT Start Time: 0944  OT Stop Time: 1000  OT Total Time (min): 16 min    Billable Minutes:Evaluation mod complexity    6/8/2022  Note entered by OTS, signed and edited by Mayela Dos Santos OT

## 2022-06-08 NOTE — PLAN OF CARE
Referral sent to [facility] in [place]. Spoke with Bhavna who confirms they will call pt with appointment date and time. Pt and wife aware.   Alyssa at [facility] will let me know if they need any additional info for the walker to be delivered to the room today for discharge.   Omar called and they cant use the BC since he has [facility] benefits  Discussed options with pt and wife. They would like to [facility] through the [facility] and know I will send the info and [facility] will arrange for delivery to the home. They also have the  Option to purchase a walker but decline wanting to do this.   Message left with Jackie with the [facility] and I will be sending order to her.   Jackie called and confirms she will assist with getting outpt PT and walker. I have faxed orders and packet to her, she will submit to the [facility] physician for approval  And finish process with [facility]

## 2022-06-08 NOTE — PT/OT/SLP EVAL
Physical Therapy Evaluation    Patient Name:  Chandu Sosa   MRN:  [id number]    Recommendations:     Discharge Recommendations:  home, outpatient PT   Discharge Equipment Recommendations: walker, rolling   Barriers to discharge: impaired mobility    Assessment:     Chandu Sosa is a 71 y.o. male admitted with a medical diagnosis of Benign essential tremor, s/p revision of bilateral DBS.  He presents with the following impairments/functional limitations:  impaired endurance, impaired functional mobilty, gait instability, impaired balance, decreased coordination. Patient up in chair upon arrival, agreeable to PT evaluation. Pt with good strength in BLEs, required Georgette for sit<>stand and CGA for ambulation in room with RW. Pt is appropriate for continued acute PT services with recommended d/c home with RW and outpatient PT follow up.     Rehab Prognosis: Good; patient would benefit from acute skilled PT services to address these deficits and reach maximum level of function.    Recent Surgery: Procedure(s) (LRB):  INSERTION, DEEP BRAIN STIMULATOR (N/A) 1 Day Post-Op    Plan:     During this hospitalization, patient to be seen daily to address the identified rehab impairments via gait training, therapeutic activities, therapeutic exercises, neuromuscular re-education and progress toward the following goals:    · Plan of Care Expires:  06/30/22    Subjective     Chief Complaint: none stated  Patient/Family Comments/goals: to get stronger and go home  Pain/Comfort:  · Pain Rating 1: 0/10    Patients cultural, spiritual, Anglican conflicts given the current situation:      Living Environment:  Pt lives with wife in [facility] with no [place].  Prior to admission, patients level of function was independent with mobility using cane, assist needed for ADLs due to tremor.  Equipment used at home: cane, straight.  DME owned (not currently used): none.  Upon discharge, patient will have assistance from wife.    Objective:      Communicated with RN prior to session.  Patient found up in chair with peripheral IV  upon PT entry to room.    General Precautions: Standard, fall   Orthopedic Precautions:N/A   Braces: N/A  Respiratory Status: Room air    Exams:  · RLE ROM: WNL  · RLE Strength: WNL  · LLE ROM: WNL  · LLE Strength: WNL    Functional Mobility:  · Transfers:  Sit to Stand:  minimum assistance with rolling walker  · Gait: Pt ambulated 80ft with Georgette and RW, cues required for safety during gait.       AM-PAC 6 CLICK MOBILITY  Total Score:18     Patient left up in chair with all lines intact, call button in reach and RN notified.    GOALS:   Multidisciplinary Problems     Physical Therapy Goals        Problem: Physical Therapy    Goal Priority Disciplines Outcome Goal Variances Interventions   Physical Therapy Goal     PT, PT/OT Ongoing, Progressing     Description: Goals to be met by: 22     Patient will increase functional independence with mobility by performin. Sit to stand transfer with Modified [place]  2. Gait  x 200 feet with Modified Boyle using Rolling Walker.                      History:     Past Medical History:   Diagnosis Date    AA (alcohol abuse)     in past    Arthritis     Cancer     Essential tremor     [place] (hard of hearing)     Sleep apnea     does not use CPAP    Waldenstrom's macroglobulinemia        Past Surgical History:   Procedure Laterality Date    APPENDECTOMY      Bilateral VIM DBSand left chest IPG  2013    Dr. Francisco    CATARACT EXTRACTION Bilateral     COLONOSCOPY      RETINAL DETACHMENT SURGERY Bilateral     revision of right VIM DBS lead Right 09/15/2015    Dr. Francisco    TONSILLECTOMY  1959    TUMOR EXCISION         Time Tracking:     PT Received On: 22  PT Start Time: 1007     PT Stop Time: 1021  PT Total Time (min): 14 min     Billable Minutes: Evaluation Mod complexity      2022

## 2022-06-08 NOTE — PLAN OF CARE
Problem: Physical Therapy  Goal: Physical Therapy Goal  Description: Goals to be met by: 22     Patient will increase functional independence with mobility by performin. Sit to stand transfer with Modified [place]  2. Gait  x 200 feet with Modified [place] using Rolling Walker.     Outcome: Ongoing, Progressing

## 2022-06-08 NOTE — PROGRESS NOTES
Progress Note  Neurosurgery    Admit Date: 6/7/2022  Post-operative Day: 1 Day Post-Op  Hospital Day: 2    SUBJECTIVE:     Follow-up For:  Procedure(s) (LRB):  INSERTION, DEEP BRAIN STIMULATOR (N/A)    Postop day 1. Status post revision of bilateral the IM DBS electrodes.  His incisional pain is well controlled.  He is having some issues with urinary retention and had to have indwelling Russell placed overnight.  He has had issues with this in the past.  His tremors are at baseline his wife is at bedside    Scheduled Meds:   famotidine  20 mg Oral BID    meperidine  12.5 mg Intravenous Once    tamsulosin  0.8 mg Oral Daily     Continuous Infusions:   sodium chloride 0.9% 75 mL/hr at 06/07/22 2216    electrolyte-A       PRN Meds:acetaminophen, ceFAZolin (ANCEF) IVPB, HYDROcodone-acetaminophen, HYDROcodone-acetaminophen, HYDROmorphone, ondansetron, ondansetron    Review of patient's allergies indicates:   Allergen Reactions    Bee sting kit Hives and Shortness Of Breath       OBJECTIVE:     Vital Signs (Most Recent)  Temp: 99.2 °F (37.3 °C) (06/08/22 0804)  Pulse: 93 (06/08/22 0804)  Resp: 18 (06/08/22 0804)  BP: 122/71 (06/08/22 0804)  SpO2: 96 % (06/08/22 0804)    Vital Signs Range (Last 24H):  Temp:  [97.9 °F (36.6 °C)-99.7 °F (37.6 °C)]   Pulse:  [57-97]   Resp:  [10-21]   BP: (106-144)/(59-87)   SpO2:  [91 %-99 %]     I & O (Last 24H):    Intake/Output Summary (Last 24 hours) at 6/8/2022 1007  Last data filed at 6/8/2022 0500  Gross per 24 hour   Intake 462.9 ml   Output 2050 ml   Net -1587.1 ml     Physical Exam:  Awake alert oriented  No focal deficits on motor exam  Tremors at baseline      Lines/Drains:       Peripheral IV - Single Lumen 06/07/22 0543 18 G Anterior;Right Forearm (Active)   Site Assessment Clean;Dry;Intact;No redness;No swelling 06/07/22 1900   Extremity Assessment Distal to IV No abnormal discoloration;No redness;No swelling;No warmth 06/07/22 1600   Line Status Saline locked 06/08/22  0826   Dressing Status Clean;Dry;Intact 06/07/22 1900   Dressing Intervention Integrity maintained 06/07/22 1900   Number of days: 1       Wound/Incision:  There is some small amount drainage, serosanguineous, on the right scalp dressing.  This was changed, no active drainage in.  Neck dressing is clean dry and intact.    Laboratory:  CBC:   Recent Labs   Lab 06/08/22  0441   WBC 9.5   RBC 3.88*   HGB 12.6*   HCT 38.6*      MCV 99.5*   MCH 32.5*   MCHC 32.6*     BMP:   Recent Labs   Lab 06/08/22  0441      K 4.7   CO2 23   BUN 9.1   CREATININE 0.72*   CALCIUM 8.8       ASSESSMENT/PLAN:     Problem List Items Addressed This Visit        Neuro    * (Principal) Benign essential tremor       PLAN:  Patient has been started on Flomax.  Will try to discontinue Russell later today.  Discussed with nursing and patient.  Possible discharge home later today versus tomorrow.  Continue current pain regimen.  Keep incisions dressed until postop day 3 (Friday).

## 2022-06-08 NOTE — DISCHARGE SUMMARY
[facility]  Neurosurgery  Discharge Summary      Patient Name: Chandu Sosa  MRN: [id number]  Admission Date: 6/7/2022  Hospital Length of Stay: 1 days  Discharge Date and Time:  06/08/2022 1:36 PM  Attending Physician: Kieran Hsieh MD   Discharging Provider: Nellie [facility]  Primary Care Provider: Primary Doctor No    HPI:   No notes on file    Procedure(s) (LRB):  INSERTION, DEEP BRAIN STIMULATOR (N/A)   Revision of bilateral VIM DBS electrodes on 6/7/2022     Hospital Course: Patient tolerated the procedure well and was admitted to the post-op floor from PACU. Incisional pain was controlled with oral medications. Tolerating oral intake. Tremors and mobility at baseline. He was experiencing urinary retention post-operatively, requiring in and out catheterization. Indwelling bennett was subsequently placed. He was started on Flomax this morning. Bennett was clamped, patient did not have sensation of full bladder. Discussed with Maryjo Urology NP. Patient will be discharged home with indwelling bennett and will f/u outpatient with urology in one week. His staples will be removed at 10 days post-op. Rx for [place] sent to pharmacy downstairs.       Goals of Care Treatment Preferences:  Code Status: Full Code      Consults:   Consults (From admission, onward)        Status Ordering Provider     Inpatient consult to Social Work/Case Management  Once        Provider:  (Not yet assigned)    Acknowledged KIERAN HSIEH     Inpatient consult to Social Work/Case Management  Once        Provider:  (Not yet assigned)    Acknowledged KIERAN HSIEH          Significant Diagnostic Studies: Radiology: CT scan: CT head post-operatively reveals satisfactory position of DBS electrodes, no acute hemorrhage/injury noted    Pending Diagnostic Studies:     Procedure Component Value Units Date/Time    Specimen to Pathology [[id number]] Collected: 06/07/22 1051    Order Status: Sent Lab Status: In  process Updated: 06/08/22 1007    Specimen: Foreign Body, Specify Object         Final Active Diagnoses:    Diagnosis Date Noted POA    PRINCIPAL PROBLEM:  Benign essential tremor [G25.0] 05/27/2022 Yes      Problems Resolved During this Admission:      Discharged Condition: stable     Disposition: Home or Self Care    Follow Up:   Follow-up Information     [facility] Follow up.    Specialty: Physical Therapy  Why: Office will call with date and time  Contact information:  [address] [facility] [address]  [phone]                       Patient Instructions:      COVID-19 Routine Screening   Standing Status: Future Number of Occurrences: 1 Standing Exp. Date: 08/01/23     Order Specific Question Answer Comments   Is the patient symptomatic? No    Is this needed for pre-procedure or pre-op testing? Yes    Diagnosis: Pre-op testing [914472]      Ambulatory referral/consult to Urology   Standing Status: Future   Referral Priority: Routine Referral Type: Consultation   Referral Reason: Specialty Services Required   Referred to Provider: JUDY METCALF Requested Specialty: Urology   Number of Visits Requested: 1     Diet Adult Regular     Remove dressing in 48 hours   Order Comments: OK to remove dressings and leave open to air on Friday. OK to shower on Friday and get incisions wet, do not submerge under water     Medications:  Reconciled Home Medications:      Medication List      START taking these medications    HYDROcodone-acetaminophen 5-325 mg per tablet  Commonly known as: NORCO  Take 1 tablet by mouth every 4 (four) hours as needed for Pain.     tamsulosin 0.4 mg Cap  Commonly known as: FLOMAX  Take 2 capsules (0.8 mg total) by mouth every evening.        CONTINUE taking these medications    alpha lipoic acid 600 mg Cap  Take by mouth Daily.     magnesium oxide-Mg AA chelate 133 mg Tab  Commonly known as: MG-PLUS-PROTEIN  Take 200 mg by mouth Daily.      zinc gluconate 50 mg tablet  Take 50 mg by mouth once daily.        STOP taking these medications    aspirin 81 MG Chew     TURMERIC ORAL            ANGIE Recinos-BC  Neurosurgery  [facility] - Ortho Neuro

## 2022-06-08 NOTE — PLAN OF CARE
Jackie with [facility] called that pt does have authorization for the outpt PT; auth number is [id number] and the  if needed is Ms Gill . This info sent to Bhavna at [facility] therapy

## 2022-06-08 NOTE — PLAN OF CARE
Problem: Occupational Therapy  Goal: Occupational Therapy Goal  Description: Goals to be met by: 6/22/22    Patient will increase functional independence with ADLs by performing:    Feeding with Moderate Assistance with appropriate AD.  UE Dressing with Minimal Assistance.  Grooming while seated at sink with Moderate Assistance.  Toileting from toilet with Moderate Assistance for hygiene and clothing management.   Toilet transfer to toilet with CGA Assistance.    Outcome: Ongoing, Progressing

## 2022-06-09 ENCOUNTER — PATIENT OUTREACH (OUTPATIENT)
Dept: ADMINISTRATIVE | Facility: CLINIC | Age: 72
End: 2022-06-09
Payer: OTHER GOVERNMENT

## 2022-06-09 ENCOUNTER — PATIENT MESSAGE (OUTPATIENT)
Dept: NEUROSURGERY | Facility: CLINIC | Age: 72
End: 2022-06-09
Payer: OTHER GOVERNMENT

## 2022-06-09 ENCOUNTER — TELEPHONE (OUTPATIENT)
Dept: NEUROLOGY | Facility: CLINIC | Age: 72
End: 2022-06-09
Payer: OTHER GOVERNMENT

## 2022-06-09 NOTE — TELEPHONE ENCOUNTER
"Mr. Huynh called to make post-op DBS appt. Had sx 6.7.22  ..  He says was told that Paul needed to be here.  (wasn't sure if a specific date may have been discussed when Paul was here yesterday?)  ..  Whoever calls him back: he prefers "MR HUYNH" and not to be called by his first name, charity  "

## 2022-06-09 NOTE — ANESTHESIA POSTPROCEDURE EVALUATION
Anesthesia Post Evaluation    Patient: Chandu Sosa    Procedure(s) Performed: Procedure(s) (LRB):  INSERTION, DEEP BRAIN STIMULATOR (N/A)    Final Anesthesia Type: general      Patient location during evaluation: PACU  Patient participation: Yes- Able to Participate  Level of consciousness: awake and alert and oriented  Post-procedure vital signs: reviewed and stable  Pain management: adequate  Airway patency: patent  JUNITO mitigation strategies: Verification of full reversal of neuromuscular block  PONV status at discharge: No PONV  Anesthetic complications: no      Cardiovascular status: blood pressure returned to baseline and stable  Respiratory status: spontaneous ventilation and unassisted  Hydration status: euvolemic  Follow-up not needed.  Comments: [facility]          Vitals Value Taken Time   /68 06/08/22 1434   Temp 37.5 °C (99.5 °F) 06/08/22 1434   Pulse 82 06/08/22 1434   Resp 18 06/08/22 1434   SpO2 96 % 06/08/22 1434         Event Time   Out of Recovery 15:34:37         Pain/Alfred Score: Pain Rating Prior to Med Admin: 7 (6/8/2022  4:37 AM)

## 2022-06-09 NOTE — PROGRESS NOTES
C3 nurse spoke with Chandu Sosa for a TCC post hospital discharge follow up call. The patient reports does not have a scheduled HOSFU appointment.  Patient advised to contact their PCP to schedule a HOSPFU within 7-14 days. The patient does have an appointment with Dr. Saenz on 6/17/2022 @ [id number]

## 2022-06-10 ENCOUNTER — PATIENT MESSAGE (OUTPATIENT)
Dept: ADMINISTRATIVE | Facility: OTHER | Age: 72
End: 2022-06-10
Payer: OTHER GOVERNMENT

## 2022-06-12 LAB
ESTROGEN SERPL-MCNC: NORMAL PG/ML
INSULIN SERPL-ACNC: NORMAL U[IU]/ML
LAB AP CLINICAL INFORMATION: NORMAL
LAB AP GROSS DESCRIPTION: NORMAL
LAB AP REPORT FOOTNOTES: NORMAL
T3RU NFR SERPL: NORMAL %

## 2022-06-17 ENCOUNTER — CLINICAL SUPPORT (OUTPATIENT)
Dept: NEUROSURGERY | Facility: CLINIC | Age: 72
End: 2022-06-17
Payer: OTHER GOVERNMENT

## 2022-06-17 DIAGNOSIS — G25.0 BENIGN ESSENTIAL TREMOR: Primary | ICD-10-CM

## 2022-06-17 NOTE — PROGRESS NOTES
[facility]  Neurosurgery    CHIEF COMPLAINT:    Post-operative wound check/staple removal    HPI:    Chandu Sosa is a 71 y.o.-year-old male who presents today for staple removal.  He is s/p revision of bilateral VIM DBS that was done on 6/7/2022.  He denies fevers, chills, night sweats or N/V. He is very satisfied with he procedure.       Review of patient's allergies indicates:   Allergen Reactions    Bee sting kit Hives and Shortness Of Breath       Current Outpatient Medications   Medication Sig Dispense Refill    alpha lipoic acid 600 mg Cap Take by mouth Daily.      HYDROcodone-acetaminophen (NORCO) 5-325 mg per tablet Take 1 tablet by mouth every 4 (four) hours as needed for Pain. (Patient not taking: Reported on 6/9/2022) 42 tablet 0    magnesium oxide-Mg AA chelate (MG-PLUS-PROTEIN) 133 mg Tab Take 200 mg by mouth Daily.      tamsulosin (FLOMAX) 0.4 mg Cap Take 2 capsules (0.8 mg total) by mouth every evening. 60 capsule 11    zinc gluconate 50 mg tablet Take 50 mg by mouth once daily.       No current facility-administered medications for this visit.       Past Medical History:   Diagnosis Date    AA (alcohol abuse)     in past    Arthritis     Cancer     Essential tremor     [place] (hard of hearing)     Sleep apnea     does not use CPAP    Waldenstrom's macroglobulinemia      Past Surgical History:   Procedure Laterality Date    APPENDECTOMY      Bilateral VIM DBSand left chest IPG  07/02/2013    Dr. Francisco    CATARACT EXTRACTION Bilateral     COLONOSCOPY      RETINAL DETACHMENT SURGERY Bilateral     revision of right VIM DBS lead Right 09/15/2015    Dr. Francisco    TONSILLECTOMY  1959    TUMOR EXCISION  2008     Family History    None       Social History     Socioeconomic History    Marital status:    Tobacco Use    Smoking status: Former Smoker     Types: Cigars    Smokeless tobacco: Never Used   Substance and Sexual Activity    Alcohol use: Yes      Comment: rarely    Drug use: Never         Physical Exam:    General: well developed, well nourished, no distress  Neurologic: Alert and oriented. Thought content appropriate.   Cranial nerves: face symmetric, pupils equal, round, reactive to light with accomodation, EOMI.   Motor Strength: moves all extremities with good strength and tone      Scalp incision:  C/D/I  Staples intact, removed without difficulty    Gait:   normal        Assessment/Plan:     -Keep incision open to air   -Can shower and get incision wet, just pat dry and no vigorous scrubbing. Do not submerge incision for another 2 weeks.   -IPG replacement scheduled on 6/21/22.  -Encouraged patient to call if they have any questions or concerns prior to next follow up appt      Silke Mckeon LPN

## 2022-06-17 NOTE — H&P (VIEW-ONLY)
[facility]  Neurosurgery    CHIEF COMPLAINT:    Post-operative wound check/staple removal    HPI:    Chandu Sosa is a 71 y.o.-year-old male who presents today for staple removal.  He is s/p revision of bilateral VIM DBS that was done on 6/7/2022.  He denies fevers, chills, night sweats or N/V. He is very satisfied with he procedure.       Review of patient's allergies indicates:   Allergen Reactions    Bee sting kit Hives and Shortness Of Breath       Current Outpatient Medications   Medication Sig Dispense Refill    alpha lipoic acid 600 mg Cap Take by mouth Daily.      HYDROcodone-acetaminophen (NORCO) 5-325 mg per tablet Take 1 tablet by mouth every 4 (four) hours as needed for Pain. (Patient not taking: Reported on 6/9/2022) 42 tablet 0    magnesium oxide-Mg AA chelate (MG-PLUS-PROTEIN) 133 mg Tab Take 200 mg by mouth Daily.      tamsulosin (FLOMAX) 0.4 mg Cap Take 2 capsules (0.8 mg total) by mouth every evening. 60 capsule 11    zinc gluconate 50 mg tablet Take 50 mg by mouth once daily.       No current facility-administered medications for this visit.       Past Medical History:   Diagnosis Date    AA (alcohol abuse)     in past    Arthritis     Cancer     Essential tremor     [place] (hard of hearing)     Sleep apnea     does not use CPAP    Waldenstrom's macroglobulinemia      Past Surgical History:   Procedure Laterality Date    APPENDECTOMY      Bilateral VIM DBSand left chest IPG  07/02/2013    Dr. Francisco    CATARACT EXTRACTION Bilateral     COLONOSCOPY      RETINAL DETACHMENT SURGERY Bilateral     revision of right VIM DBS lead Right 09/15/2015    Dr. Francisco    TONSILLECTOMY  1959    TUMOR EXCISION  2008     Family History    None       Social History     Socioeconomic History    Marital status:    Tobacco Use    Smoking status: Former Smoker     Types: Cigars    Smokeless tobacco: Never Used   Substance and Sexual Activity    Alcohol use: Yes      Comment: rarely    Drug use: Never         Physical Exam:    General: well developed, well nourished, no distress  Neurologic: Alert and oriented. Thought content appropriate.   Cranial nerves: face symmetric, pupils equal, round, reactive to light with accomodation, EOMI.   Motor Strength: moves all extremities with good strength and tone      Scalp incision:  C/D/I  Staples intact, removed without difficulty    Gait:   normal        Assessment/Plan:     -Keep incision open to air   -Can shower and get incision wet, just pat dry and no vigorous scrubbing. Do not submerge incision for another 2 weeks.   -IPG replacement scheduled on 6/21/22.  -Encouraged patient to call if they have any questions or concerns prior to next follow up appt      Silke Mckeon LPN

## 2022-06-18 ENCOUNTER — PATIENT MESSAGE (OUTPATIENT)
Dept: ADMINISTRATIVE | Facility: OTHER | Age: 72
End: 2022-06-18
Payer: OTHER GOVERNMENT

## 2022-06-19 ENCOUNTER — PATIENT MESSAGE (OUTPATIENT)
Dept: ADMINISTRATIVE | Facility: OTHER | Age: 72
End: 2022-06-19
Payer: OTHER GOVERNMENT

## 2022-06-20 ENCOUNTER — PATIENT MESSAGE (OUTPATIENT)
Dept: ADMINISTRATIVE | Facility: OTHER | Age: 72
End: 2022-06-20
Payer: OTHER GOVERNMENT

## 2022-06-20 ENCOUNTER — ANESTHESIA EVENT (OUTPATIENT)
Dept: SURGERY | Facility: HOSPITAL | Age: 72
End: 2022-06-20
Payer: OTHER GOVERNMENT

## 2022-06-20 DIAGNOSIS — G25.0 BENIGN ESSENTIAL TREMOR: Primary | ICD-10-CM

## 2022-06-20 RX ORDER — MUPIROCIN 20 MG/G
OINTMENT TOPICAL
Status: CANCELLED | OUTPATIENT
Start: 2022-06-20

## 2022-06-20 RX ORDER — MUPIROCIN 20 MG/G
1 OINTMENT TOPICAL 2 TIMES DAILY
Status: CANCELLED | OUTPATIENT
Start: 2022-06-20 | End: 2022-06-21

## 2022-06-21 ENCOUNTER — ANESTHESIA (OUTPATIENT)
Dept: SURGERY | Facility: HOSPITAL | Age: 72
End: 2022-06-21
Payer: OTHER GOVERNMENT

## 2022-06-21 ENCOUNTER — HOSPITAL ENCOUNTER (OUTPATIENT)
Facility: HOSPITAL | Age: 72
Discharge: HOME | End: 2022-06-21
Attending: NEUROLOGICAL SURGERY | Admitting: NEUROLOGICAL SURGERY
Payer: OTHER GOVERNMENT

## 2022-06-21 DIAGNOSIS — G25.0 BENIGN ESSENTIAL TREMOR: ICD-10-CM

## 2022-06-21 LAB — SARS-COV-2 RDRP RESP QL NAA+PROBE: NEGATIVE

## 2022-06-21 PROCEDURE — 63600175 PHARM REV CODE 636 W HCPCS: Performed by: NURSE PRACTITIONER

## 2022-06-21 PROCEDURE — 27800903 OPTIME MED/SURG SUP & DEVICES OTHER IMPLANTS: Performed by: NEUROLOGICAL SURGERY

## 2022-06-21 PROCEDURE — 63600175 PHARM REV CODE 636 W HCPCS: Performed by: ANESTHESIOLOGY

## 2022-06-21 PROCEDURE — 61886 PR IMP STIM,CRANIAL,SUBQ,>1 ARRAY: ICD-10-PCS | Mod: 58,,, | Performed by: NEUROLOGICAL SURGERY

## 2022-06-21 PROCEDURE — 63600175 PHARM REV CODE 636 W HCPCS

## 2022-06-21 PROCEDURE — C1787 PATIENT PROGR, NEUROSTIM: HCPCS | Performed by: NEUROLOGICAL SURGERY

## 2022-06-21 PROCEDURE — 61886 IMPLANT NEUROSTIM ARRAYS: CPT | Mod: 58,,, | Performed by: NEUROLOGICAL SURGERY

## 2022-06-21 PROCEDURE — 63600175 PHARM REV CODE 636 W HCPCS: Performed by: NEUROLOGICAL SURGERY

## 2022-06-21 PROCEDURE — 25000003 PHARM REV CODE 250

## 2022-06-21 PROCEDURE — C1883 ADAPT/EXT, PACING/NEURO LEAD: HCPCS | Performed by: NEUROLOGICAL SURGERY

## 2022-06-21 PROCEDURE — C1767 GENERATOR, NEURO NON-RECHARG: HCPCS | Performed by: NEUROLOGICAL SURGERY

## 2022-06-21 PROCEDURE — 71000016 HC POSTOP RECOV ADDL HR: Performed by: NEUROLOGICAL SURGERY

## 2022-06-21 PROCEDURE — 87635 SARS-COV-2 COVID-19 AMP PRB: CPT | Performed by: NEUROLOGICAL SURGERY

## 2022-06-21 PROCEDURE — 37000008 HC ANESTHESIA 1ST 15 MINUTES: Performed by: NEUROLOGICAL SURGERY

## 2022-06-21 PROCEDURE — 71000015 HC POSTOP RECOV 1ST HR: Performed by: NEUROLOGICAL SURGERY

## 2022-06-21 PROCEDURE — 00400 ANES INTEGUMENTARY SYS NOS: CPT | Performed by: NEUROLOGICAL SURGERY

## 2022-06-21 PROCEDURE — 36000711: Performed by: NEUROLOGICAL SURGERY

## 2022-06-21 PROCEDURE — 36000710: Performed by: NEUROLOGICAL SURGERY

## 2022-06-21 PROCEDURE — 27201423 OPTIME MED/SURG SUP & DEVICES STERILE SUPPLY: Performed by: NEUROLOGICAL SURGERY

## 2022-06-21 PROCEDURE — 71000033 HC RECOVERY, INTIAL HOUR: Performed by: NEUROLOGICAL SURGERY

## 2022-06-21 PROCEDURE — 61886 PR IMP STIM,CRANIAL,SUBQ,>1 ARRAY: ICD-10-PCS | Mod: AS,58,, | Performed by: NURSE PRACTITIONER

## 2022-06-21 PROCEDURE — 25000003 PHARM REV CODE 250: Performed by: NEUROLOGICAL SURGERY

## 2022-06-21 PROCEDURE — 61886 IMPLANT NEUROSTIM ARRAYS: CPT | Mod: AS,58,, | Performed by: NURSE PRACTITIONER

## 2022-06-21 PROCEDURE — 37000009 HC ANESTHESIA EA ADD 15 MINS: Performed by: NEUROLOGICAL SURGERY

## 2022-06-21 DEVICE — NEUROSTIMULATR PERCEPT 58X51MM: Type: IMPLANTABLE DEVICE | Site: NECK | Status: FUNCTIONAL

## 2022-06-21 DEVICE — IMPLANTABLE DEVICE: Type: IMPLANTABLE DEVICE | Site: NECK | Status: FUNCTIONAL

## 2022-06-21 RX ORDER — MUPIROCIN 20 MG/G
OINTMENT TOPICAL 2 TIMES DAILY
Status: DISCONTINUED | OUTPATIENT
Start: 2022-06-21 | End: 2022-06-21 | Stop reason: HOSPADM

## 2022-06-21 RX ORDER — MUPIROCIN 20 MG/G
1 OINTMENT TOPICAL 2 TIMES DAILY
Status: DISCONTINUED | OUTPATIENT
Start: 2022-06-21 | End: 2022-06-21

## 2022-06-21 RX ORDER — DEXAMETHASONE SODIUM PHOSPHATE 4 MG/ML
INJECTION, SOLUTION INTRA-ARTICULAR; INTRALESIONAL; INTRAMUSCULAR; INTRAVENOUS; SOFT TISSUE
Status: DISCONTINUED | OUTPATIENT
Start: 2022-06-21 | End: 2022-06-21

## 2022-06-21 RX ORDER — MIDAZOLAM HYDROCHLORIDE 1 MG/ML
2 INJECTION INTRAMUSCULAR; INTRAVENOUS ONCE AS NEEDED
Status: DISCONTINUED | OUTPATIENT
Start: 2022-06-21 | End: 2022-06-21 | Stop reason: HOSPADM

## 2022-06-21 RX ORDER — ONDANSETRON HYDROCHLORIDE 2 MG/ML
INJECTION, SOLUTION INTRAMUSCULAR; INTRAVENOUS
Status: DISCONTINUED | OUTPATIENT
Start: 2022-06-21 | End: 2022-06-21

## 2022-06-21 RX ORDER — PHENYLEPHRINE HCL IN 0.9% NACL 1 MG/10 ML
SYRINGE (ML) INTRAVENOUS
Status: DISCONTINUED | OUTPATIENT
Start: 2022-06-21 | End: 2022-06-21

## 2022-06-21 RX ORDER — MUPIROCIN 20 MG/G
OINTMENT TOPICAL
Status: DISCONTINUED | OUTPATIENT
Start: 2022-06-21 | End: 2022-06-21

## 2022-06-21 RX ORDER — FENTANYL CITRATE 50 UG/ML
INJECTION, SOLUTION INTRAMUSCULAR; INTRAVENOUS
Status: DISCONTINUED | OUTPATIENT
Start: 2022-06-21 | End: 2022-06-21

## 2022-06-21 RX ORDER — HYDROMORPHONE HYDROCHLORIDE 2 MG/ML
0.4 INJECTION, SOLUTION INTRAMUSCULAR; INTRAVENOUS; SUBCUTANEOUS EVERY 5 MIN PRN
Status: DISCONTINUED | OUTPATIENT
Start: 2022-06-21 | End: 2022-06-21 | Stop reason: HOSPADM

## 2022-06-21 RX ORDER — LIDOCAINE HYDROCHLORIDE AND EPINEPHRINE 5; 5 MG/ML; UG/ML
INJECTION, SOLUTION INFILTRATION; PERINEURAL
Status: DISCONTINUED | OUTPATIENT
Start: 2022-06-21 | End: 2022-06-21 | Stop reason: HOSPADM

## 2022-06-21 RX ORDER — MEPERIDINE HYDROCHLORIDE 25 MG/ML
12.5 INJECTION INTRAMUSCULAR; INTRAVENOUS; SUBCUTANEOUS ONCE
Status: DISCONTINUED | OUTPATIENT
Start: 2022-06-21 | End: 2022-06-21 | Stop reason: HOSPADM

## 2022-06-21 RX ORDER — SODIUM CHLORIDE 0.9 % (FLUSH) 0.9 %
10 SYRINGE (ML) INJECTION
Status: DISCONTINUED | OUTPATIENT
Start: 2022-06-21 | End: 2022-06-21 | Stop reason: HOSPADM

## 2022-06-21 RX ORDER — HYDROCODONE BITARTRATE AND ACETAMINOPHEN 5; 325 MG/1; MG/1
1 TABLET ORAL EVERY 4 HOURS PRN
Status: DISCONTINUED | OUTPATIENT
Start: 2022-06-21 | End: 2022-06-21 | Stop reason: HOSPADM

## 2022-06-21 RX ORDER — METHOCARBAMOL 750 MG/1
750 TABLET, FILM COATED ORAL EVERY 8 HOURS PRN
Status: DISCONTINUED | OUTPATIENT
Start: 2022-06-21 | End: 2022-06-21 | Stop reason: HOSPADM

## 2022-06-21 RX ORDER — ONDANSETRON 2 MG/ML
4 INJECTION INTRAMUSCULAR; INTRAVENOUS EVERY 8 HOURS PRN
Status: DISCONTINUED | OUTPATIENT
Start: 2022-06-21 | End: 2022-06-21 | Stop reason: HOSPADM

## 2022-06-21 RX ORDER — ROCURONIUM BROMIDE 10 MG/ML
INJECTION, SOLUTION INTRAVENOUS
Status: DISCONTINUED | OUTPATIENT
Start: 2022-06-21 | End: 2022-06-21

## 2022-06-21 RX ORDER — SODIUM CHLORIDE, SODIUM LACTATE, POTASSIUM CHLORIDE, CALCIUM CHLORIDE 600; 310; 30; 20 MG/100ML; MG/100ML; MG/100ML; MG/100ML
INJECTION, SOLUTION INTRAVENOUS CONTINUOUS
Status: DISCONTINUED | OUTPATIENT
Start: 2022-06-21 | End: 2022-06-21 | Stop reason: HOSPADM

## 2022-06-21 RX ORDER — PROPOFOL 10 MG/ML
VIAL (ML) INTRAVENOUS
Status: DISCONTINUED | OUTPATIENT
Start: 2022-06-21 | End: 2022-06-21

## 2022-06-21 RX ORDER — HYDROCODONE BITARTRATE AND ACETAMINOPHEN 10; 325 MG/1; MG/1
1 TABLET ORAL EVERY 4 HOURS PRN
Status: DISCONTINUED | OUTPATIENT
Start: 2022-06-21 | End: 2022-06-21 | Stop reason: HOSPADM

## 2022-06-21 RX ORDER — SODIUM CHLORIDE, SODIUM GLUCONATE, SODIUM ACETATE, POTASSIUM CHLORIDE AND MAGNESIUM CHLORIDE 30; 37; 368; 526; 502 MG/100ML; MG/100ML; MG/100ML; MG/100ML; MG/100ML
1000 INJECTION, SOLUTION INTRAVENOUS CONTINUOUS
Status: DISCONTINUED | OUTPATIENT
Start: 2022-06-21 | End: 2022-06-21 | Stop reason: HOSPADM

## 2022-06-21 RX ORDER — BACITRACIN 500 [USP'U]/G
OINTMENT TOPICAL
Status: DISCONTINUED | OUTPATIENT
Start: 2022-06-21 | End: 2022-06-21 | Stop reason: HOSPADM

## 2022-06-21 RX ORDER — EPHEDRINE SULFATE 50 MG/ML
INJECTION, SOLUTION INTRAVENOUS
Status: DISCONTINUED | OUTPATIENT
Start: 2022-06-21 | End: 2022-06-21

## 2022-06-21 RX ORDER — ONDANSETRON 2 MG/ML
4 INJECTION INTRAMUSCULAR; INTRAVENOUS ONCE
Status: DISCONTINUED | OUTPATIENT
Start: 2022-06-21 | End: 2022-06-21 | Stop reason: HOSPADM

## 2022-06-21 RX ORDER — CEFAZOLIN SODIUM 1 G/3ML
INJECTION, POWDER, FOR SOLUTION INTRAMUSCULAR; INTRAVENOUS
Status: DISCONTINUED | OUTPATIENT
Start: 2022-06-21 | End: 2022-06-21 | Stop reason: HOSPADM

## 2022-06-21 RX ORDER — LIDOCAINE HYDROCHLORIDE 20 MG/ML
INJECTION, SOLUTION EPIDURAL; INFILTRATION; INTRACAUDAL; PERINEURAL
Status: DISCONTINUED | OUTPATIENT
Start: 2022-06-21 | End: 2022-06-21

## 2022-06-21 RX ORDER — CEFAZOLIN SODIUM 2 G/50ML
2 SOLUTION INTRAVENOUS
Status: COMPLETED | OUTPATIENT
Start: 2022-06-21 | End: 2022-06-21

## 2022-06-21 RX ADMIN — PROPOFOL 150 MG: 10 INJECTION, EMULSION INTRAVENOUS at 07:06

## 2022-06-21 RX ADMIN — Medication 100 MCG: at 08:06

## 2022-06-21 RX ADMIN — ROCURONIUM BROMIDE 10 MG: 10 SOLUTION INTRAVENOUS at 09:06

## 2022-06-21 RX ADMIN — CEFAZOLIN SODIUM 2 G: 2 SOLUTION INTRAVENOUS at 07:06

## 2022-06-21 RX ADMIN — SUGAMMADEX 200 MG: 100 INJECTION, SOLUTION INTRAVENOUS at 09:06

## 2022-06-21 RX ADMIN — ONDANSETRON 4 MG: 2 INJECTION INTRAMUSCULAR; INTRAVENOUS at 08:06

## 2022-06-21 RX ADMIN — EPHEDRINE SULFATE 15 MG: 50 INJECTION INTRAVENOUS at 09:06

## 2022-06-21 RX ADMIN — DEXAMETHASONE SODIUM PHOSPHATE 4 MG: 4 INJECTION, SOLUTION INTRA-ARTICULAR; INTRALESIONAL; INTRAMUSCULAR; INTRAVENOUS; SOFT TISSUE at 07:06

## 2022-06-21 RX ADMIN — HYDROMORPHONE HYDROCHLORIDE 0.4 MG: 2 INJECTION INTRAMUSCULAR; INTRAVENOUS; SUBCUTANEOUS at 10:06

## 2022-06-21 RX ADMIN — EPHEDRINE SULFATE 10 MG: 50 INJECTION INTRAVENOUS at 08:06

## 2022-06-21 RX ADMIN — SODIUM CHLORIDE, SODIUM GLUCONATE, SODIUM ACETATE, POTASSIUM CHLORIDE AND MAGNESIUM CHLORIDE: 526; 502; 368; 37; 30 INJECTION, SOLUTION INTRAVENOUS at 09:06

## 2022-06-21 RX ADMIN — FENTANYL CITRATE 50 MCG: 50 INJECTION, SOLUTION INTRAMUSCULAR; INTRAVENOUS at 07:06

## 2022-06-21 RX ADMIN — LIDOCAINE HYDROCHLORIDE 80 MG: 20 INJECTION, SOLUTION EPIDURAL; INFILTRATION; INTRACAUDAL; PERINEURAL at 07:06

## 2022-06-21 RX ADMIN — ROCURONIUM BROMIDE 10 MG: 10 SOLUTION INTRAVENOUS at 08:06

## 2022-06-21 RX ADMIN — ROCURONIUM BROMIDE 20 MG: 10 SOLUTION INTRAVENOUS at 07:06

## 2022-06-21 RX ADMIN — SODIUM CHLORIDE, SODIUM GLUCONATE, SODIUM ACETATE, POTASSIUM CHLORIDE AND MAGNESIUM CHLORIDE: 526; 502; 368; 37; 30 INJECTION, SOLUTION INTRAVENOUS at 07:06

## 2022-06-21 RX ADMIN — ROCURONIUM BROMIDE 50 MG: 10 SOLUTION INTRAVENOUS at 07:06

## 2022-06-21 NOTE — BRIEF OP NOTE
[facility] - Periop Services  Brief Operative Note    SUMMARY     Surgery Date: 6/21/2022     Surgeon(s) and Role:     * Kieran Saenz MD - Primary    Assistant: Nellie Grant NP    Pre-op Diagnosis:  * Benign essential tremor    Post-op Diagnosis:  Post-Op Diagnosis Codes:     * Benign essential tremor [G25.0]    Procedure(s) (LRB):  INSERTION, PULSE GENERATOR, DEEP BRAIN STIMULATOR (N/A)    -removal and replacement of extension leads and left chestwall IPG for DBS, Stage II (Activa RC IPG removed; retained component removed)    Anesthesia: General    Operative Findings: Patient tolerated procedure well and was transferred to PACU.     Estimated Blood Loss: *20ml  Estimated Blood Loss has been documented.         Specimens:   Specimen (24h ago, onward)             Start     Ordered    06/21/22 0928  Specimen to Pathology  RELEASE UPON ORDERING        References:    Click here for ordering Quick Tip   Question:  Release to patient  Answer:  Immediate    06/21/22 4435                [id number]

## 2022-06-21 NOTE — DISCHARGE INSTRUCTIONS
No driving or alcohol consumption for 24 hours after procedure and for as long as you are on narcotic pain medication.            No heavy lifting or strenuous activity until cleared by your doctor.               Keep incisions clean and dry and dressing intact for 2 days. Ok to remove dressing to head after 48 hours then ok to shower afterwards. No tub baths or swimming until your doctor clears you. Do not apply any ointments to skin glue. Do not peel off skin glue.            Monitor for infection: redness, swelling, drainage, warmth, foul odor, fever, chills.

## 2022-06-21 NOTE — ANESTHESIA PREPROCEDURE EVALUATION
06/21/2022  Chandu Sosa is a 71 y.o., male   Pre-operative evaluation for Procedure(s) (LRB):  INSERTION, PULSE GENERATOR, DEEP BRAIN STIMULATOR (N/A)    BP (!) 144/69   Pulse 67   Temp 37.1 °C (98.7 °F) (Oral)   Resp 18   SpO2 97%     Patient Active Problem List   Diagnosis    Polyneuropathy    Benign essential tremor    Waldenstrom's macroglobulinemia       Review of patient's allergies indicates:   Allergen Reactions    Bee sting kit Hives and Shortness Of Breath       Current Outpatient Medications   Medication Instructions    alpha lipoic acid 600 mg Cap Oral, Daily    HYDROcodone-acetaminophen (NORCO) 5-325 mg per tablet 1 tablet, Oral, Every 4 hours PRN    magnesium oxide-Mg AA chelate (MG-PLUS-PROTEIN) 133 mg Tab 200 mg, Oral, Daily    tamsulosin (FLOMAX) 0.8 mg, Oral, Nightly    zinc gluconate 50 mg, Oral, Daily       Past Surgical History:   Procedure Laterality Date    APPENDECTOMY      Bilateral VIM DBSand left chest IPG  07/02/2013    Dr. Francisco    CATARACT EXTRACTION Bilateral     COLONOSCOPY      RETINAL DETACHMENT SURGERY Bilateral     revision of right VIM DBS lead Right 09/15/2015    Dr. Francisco    TONSILLECTOMY  1959    TUMOR EXCISION  2008       Social History     Socioeconomic History    Marital status:    Tobacco Use    Smoking status: Former Smoker     Types: Cigars    Smokeless tobacco: Never Used   Substance and Sexual Activity    Alcohol use: Yes     Comment: rarely    Drug use: Never       Lab Results   Component Value Date    WBC 9.5 06/08/2022    HGB 12.6 (L) 06/08/2022    HCT 38.6 (L) 06/08/2022    MCV 99.5 (H) 06/08/2022     06/08/2022          BMP  Lab Results   Component Value Date     06/08/2022    K 4.7 06/08/2022    CO2 23 06/08/2022    BUN 9.1 06/08/2022    CREATININE 0.72 (L) 06/08/2022    CALCIUM 8.8 06/08/2022     EGFRNONAA >60 06/08/2022          INR  No results for input(s): PT, INR, PROTIME, APTT in the last 72 hours.    No results found for: PREGTESTUR, PREGSERUM, HCG, HCGQUANT       Diagnostic Studies:      EKG:  Results for orders placed or performed in visit on 06/03/22   EKG 12-lead    Collection Time: 06/03/22  9:36 AM    Narrative    Test Reason : G25.0,    Vent. Rate : 072 BPM     Atrial Rate : 000 BPM     P-R Int : 000 ms          QRS Dur : 160 ms      QT Int : 396 ms       P-R-T Axes : 000 070 061 degrees     QTc Int : 433 ms    Baseline Artifact  Undetermined rhythm      Confirmed by Azael Shannon MD ([id number]) on 6/3/2022 11:30:23 PM    Referred By:             Confirmed By:Azael Shannon MD       2D Echo:  No results found for this or any previous visit..      Pre-op Assessment          Review of Systems  Anesthesia Hx:  No problems with previous Anesthesia  Denies Family Hx of Anesthesia complications.    Cardiovascular:  Cardiovascular Normal  No Cardiac Complaints   Pulmonary:  Pulmonary Normal No Pulmonary Complaints   Hepatic/GI:   No Current GERD Sx       Physical Exam  General: Alert and Oriented    Airway:  Mallampati: II   Mouth Opening: Normal  TM Distance: Normal  Tongue: Normal  Neck ROM: Normal ROM    Dental:  Intact    Chest/Lungs:  Clear to auscultation, Normal Respiratory Rate    Heart:  Rate: Normal  Rhythm: Regular Rhythm        Anesthesia Plan  Type of Anesthesia, risks & benefits discussed:    Anesthesia Type: Gen ETT  Intra-op Monitoring Plan: Standard ASA Monitors  Post Op Pain Control Plan: multimodal analgesia  Induction:  IV and Inhalation  Airway Plan: Direct, Post-Induction  Informed Consent: Informed consent signed with the Patient and all parties understand the risks and agree with anesthesia plan.  All questions answered. Patient consented to blood products? No  ASA Score: 3  Day of Surgery Review of History & Physical: H&P Update referred to the surgeon/provider.  Anesthesia Plan Notes:  Discussed Anesthetic Plan w/ Pt/Family. Questions Entertained. Accepted.    Ready For Surgery From Anesthesia Perspective.     .

## 2022-06-21 NOTE — INTERVAL H&P NOTE
The patient has been examined and the H&P has been reviewed:    I concur with the findings and no changes have occurred since H&P was written.    Surgery risks, benefits and alternative options discussed and understood by patient/family.

## 2022-06-21 NOTE — ANESTHESIA PROCEDURE NOTES
Intubation    Date/Time: 6/21/2022 7:24 AM  Performed by: Byron Holloway  Authorized by: Amos Hazel MD     Intubation:     Induction:  Intravenous    Intubated:  Postinduction    Mask Ventilation:  Easy mask    Attempts:  1    Attempted By:  Student    Method of Intubation:  Direct    Blade:  Song 2    Laryngeal View Grade: Grade I - full view of cords      Difficult Airway Encountered?: No      Complications:  None    Airway Device:  Oral endotracheal tube    Airway Device Size:  7.5    Style/Cuff Inflation:  Cuffed    Inflation Amount (mL):  10    Tube secured:  23    Secured at:  The lips    Placement Verified By:  Colorimetric ETCO2 device, Capnometry and Fiber optic visualization    Complicating Factors:  None    Findings Post-Intubation:  BS equal bilateral  Notes:      DL x1, ETT had cuff leak, replaced tube, no complications

## 2022-06-21 NOTE — DISCHARGE SUMMARY
[facility] Periop Services  Discharge Note  Short Stay    Procedure(s) (LRB):  INSERTION, PULSE GENERATOR, DEEP BRAIN STIMULATOR (N/A)    OUTCOME: Patient tolerated treatment/procedure well without complication and is now ready for discharge.    DISPOSITION: Home or Self Care    FINAL DIAGNOSIS: Benign Essential Tremor    FOLLOWUP: In clinic    DISCHARGE INSTRUCTIONS:  Ok to remove scalp dressings on Friday and shower, leave open to air starting Friday.     TIME SPENT ON DISCHARGE: 10 minutes

## 2022-06-21 NOTE — ANESTHESIA POSTPROCEDURE EVALUATION
Anesthesia Post Evaluation    Patient: Chandu Sosa    Procedure(s) Performed: Procedure(s) (LRB):  INSERTION, PULSE GENERATOR, DEEP BRAIN STIMULATOR (N/A)    Final Anesthesia Type: general      Patient location during evaluation: PACU  Patient participation: Yes- Able to Participate  Level of consciousness: awake and alert and oriented  Post-procedure vital signs: reviewed and stable  Pain management: adequate  Airway patency: patent    PONV status at discharge: No PONV  Anesthetic complications: no      Cardiovascular status: hemodynamically stable  Respiratory status: unassisted  Hydration status: euvolemic  Follow-up not needed.          Vitals Value Taken Time   /74 06/21/22 1022   Temp 98.0 06/21/22 1025   Pulse 87 06/21/22 1025   Resp 16 06/21/22 1020   SpO2 96 % 06/21/22 1025         Event Time   Out of Recovery 10:25:00         Pain/Alfred Score: Pain Rating Prior to Med Admin: 6 (6/21/2022 10:05 AM)  Alfred Score: 10 (6/21/2022 11:25 AM)

## 2022-06-21 NOTE — TRANSFER OF CARE
"Anesthesia Transfer of Care Note    Patient: Chandu Sosa    Procedure(s) Performed: Procedure(s) (LRB):  INSERTION, PULSE GENERATOR, DEEP BRAIN STIMULATOR (N/A)    Patient location: PACU    Anesthesia Type: general    Transport from OR: Transported from OR on room air with adequate spontaneous ventilation    Post pain: adequate analgesia    Post assessment: no apparent anesthetic complications    Post vital signs: stable    Level of consciousness: awake    Nausea/Vomiting: no nausea/vomiting    Complications: none    Transfer of care protocol was followed      Last vitals:   Visit Vitals  BP (!) 144/69   Pulse 67   Temp 37.1 °C (98.7 °F) (Oral)   Resp 18   Ht 5' 10" (1.778 m)   Wt 79.6 kg (175 lb 7.8 oz)   SpO2 97%   BMI 25.18 kg/m²     "

## 2022-06-21 NOTE — PROGRESS NOTES
At approximately 1135, patient calls RN to room to complain of urinary pain and inability to urinate successfully. Notified ELISABET Ballard. NP reports pt did have issues with urinary retention after previous surgery and had to be discharged with bennett catheter. NP gave order for one time in and out catheter to drain bladder then to bladder scan patient once he is able to successfully void on his own to check for any residual urine. Completed in and out catheter via sterile technique and drained 1,450 ml of urine. Pt immediatly reports relief of discomfort after cath. Will continue to monitor and update NP on urinary status.    At approximately 1415, patient attempted x2 to urinate and reported that he was able to urinate however did not have much output. Performed bladder scan which indicated pt had 703ml of urine in bladder. Notified ELISABET Ballard who have order to insert indwelling bennett catheter and to discharge patient with catheter as well as for him to follow up with his urologist next week.     Upon returning to patient's room to insert catheter, pt was up in bathroom continuing to attempt to urinate. Pt reports he was able to urinate successfully. Repeated bladder scan which indicated that patient continued to have 849ml of urine remaining in bladder post void. Pt reports that he wants to decline the bennett catheter and call his urologist if he continues having issues at home. Notified Dr. Saenz. Dr. Saenz declined allowing pt to be discharged with current urinary issues and recommended contacting patient's urologist to see him if he refused bennett catheter. Spoke with ELISABET Sibley with Dr. Pena's office who reported that patient needs indwelling catheter placed and to follow up with Dr. Pena next week for a voiding trial. Spoke with patient about recommendation. Pt agrees with plan and consents to bennett placement. After bennett placed, Dr. Pena arrives to round on patient and discuss treatment plan. Arrangements made  for patient to follow up with Dr. Pena tomorrow at 10am. Updated Nellie NP on pt status and discharge. Russell care instructions reviewed with wife.

## 2022-06-22 ENCOUNTER — TELEPHONE (OUTPATIENT)
Dept: ADMINISTRATIVE | Facility: HOSPITAL | Age: 72
End: 2022-06-22
Payer: OTHER GOVERNMENT

## 2022-06-22 NOTE — TELEPHONE ENCOUNTER
Please let patient know info below. He needs to let Dr. Pena's office know that he is a [facility] patient and let them handle it according to their policy.

## 2022-06-23 VITALS
OXYGEN SATURATION: 98 % | BODY MASS INDEX: 25.13 KG/M2 | HEART RATE: 92 BPM | WEIGHT: 175.5 LBS | RESPIRATION RATE: 16 BRPM | SYSTOLIC BLOOD PRESSURE: 130 MMHG | DIASTOLIC BLOOD PRESSURE: 71 MMHG | HEIGHT: 70 IN | TEMPERATURE: 99 F

## 2022-06-27 NOTE — OP NOTE
DATE OF OPERATION:   June 21, 2022    PREOPERATIVE DIAGNOSIS:   1.Medically intractable essential tremor     POSTOPERATIVE DIAGNOSIS:   1.Medically intractable essential tremor     SURGEON:  Kieran Saenz M.D.    ASSISTANT: Nellie ALDRICH     PROCEDURE:   1. Left chest wall DBS IPG implantation with Medtronic Activa Pecept generator for bilateral the Vim DBS electrodes; stage 2 of 2  2. Removal left chest wall DBS IPG and extension leads  3. Interrogation of the DBS IPG     ANESTHESIA:   General endotracheal     BLOOD LOSS:   Negligible     SPECIMEN(s):   None     COMPLICATIONS:   None     HISTORY:   Chandu Munoz is a 71-year-old gentleman with longstanding essential tremor and.  He underwent DBS implantation at [facility] with the revision of his right-sided electrode shortly after.  His IPG is at end of life for the function left brain implant, but he requested revision of the right-sided lead.  It was elected to change both leads as well as revise the IPG to accommodate directional electrodes at the 2nd stage.. Options were discussed and bilateral Vim DBS implantation was elected. This was carried out 2 weeks ago and the patient now presents for stage 2 of 2; insertion and connection of the IPG to the existing DBS leads.  We also needed to remove the existing IPG and the extension leads.  The patient understood and accepted the nature of the surgery as well as its it attendant risks.     FINDINGS:   There were no untoward findings. Impedance checks were all within normal limits.  We did have to use C-arm to localize is a retained wire between the left retroauricular incision in the right cranial incision.  This is done without incident and no retained components were left behind.  The patient tolerated the procedure well.     PROCEDURE IN DETAIL:   After endotracheal intubation and induction of general anesthesia, the patient's head was placed on a donut and turned away from the planned operative side. The  "patient received an appropriate dose of intravenous antibiotic prior to the start of procedure. The ipsilateral side of the head, neck and chest were shaved, prepped and draped in the usual fashion.  The region of the scalp where the distal portion of the electrodes had been placed was marked out and infiltrated with 1/2% Xylocaine containing 1:200,000 Epinephrine.  An incision was also marked out and infiltrated under the ipsilateral infraclavicular region.  Previous scalp incisions the were also marked.  The scalp incision was carried down to the skin and subcutaneous tissues with a knife and then dissection was carried out to find the distal end of the electrode array.  This was identified and pulled into the operative site.  Then attention was paid to the infraclavicular incision which was carried down through the skin and subcutaneous tissues with the knife.  Sharp and blunt dissection was carried out subcutaneously inferiorly to create a pocket for the new pulse generator after removing the existing generator.  The existing extension leads were then removed after opening previous retroauricular incision..  Then, the electrode passer was inserted into the scalp incision and brought out through the infraclavicular incision.  The proximal ends of the extension leads were then threaded up into the passer and then the passer was removed from the scalp incision.  The distal portion of the DBS electrodes were then exposed and attached to the proximal end of the extension lead  These was then secured with "finger tight" screw tightening using the torque limiting .  Then the distal portion of the extension leads were attached to the pulse generator and these screws were also tightened.  Then the generator was placed into the pocket, keeping any excess lead underneath the generator. The system was tested for impedance of each contact. The generator was secured to the fascia with a single 3-0 Prolene suture.  The " wound was irrigated copiously with antibiotic irrigation.  The wound was then closed without any tension with 2-0 Vicryl for the subcutaneous layer and Dermabond glue for the skin edges.  The scalp incision was also closed after irrigating copiously with antibiotic irrigation with 2-0 Vicryl for the galea and staples for the skin edges.  We then brought in the C-arm to make sure that there was no retained component more was single wire that was between the 2 cranial incisions in the left-sided pocket.  The family appropriate location, made an incision, removed the wire and then closed with 2-0 Vicryl for the galea and staples for the skin edge.  Local dressings were applied and the patient was taken to the post anesthetic care unit in satisfactory condition with correct sponge and needle counts.

## 2022-06-28 NOTE — PROGRESS NOTES
[facility]  Neurosurgery        Chandu Sosa   [id number]   1950         CHIEF COMPLAINT:    2 week post-op    HPI:    Chandu Sosa is a 71 y.o.-year-old male who presents today for post-operative follow-up.  He is s/p removal and replacement of left chest wall IPG for bilateral DBS that was done on 6/21/22.  He underwent revision of bilateral VIM DBS electrodes on 6/7/22.  He has continued to improve since surgery.  He is scheduled to follow up with Dr. Camargo next week.  He was seen by Dr. Pena since his hospitalization.  They are working on getting VA authorization for further testing and visits.        Review of patient's allergies indicates:   Allergen Reactions    Bee sting kit Hives and Shortness Of Breath       Current Outpatient Medications   Medication Sig Dispense Refill    alpha lipoic acid 600 mg Cap Take by mouth Daily.      magnesium oxide-Mg AA chelate (MG-PLUS-PROTEIN) 133 mg Tab Take 200 mg by mouth Daily.      tamsulosin (FLOMAX) 0.4 mg Cap Take 2 capsules (0.8 mg total) by mouth every evening. 60 capsule 11    TUMERIC-GING-OLIVE-OREG-CAPRYL ORAL Take 1 tablet by mouth once daily.      zinc gluconate 50 mg tablet Take 50 mg by mouth once daily.       No current facility-administered medications for this visit.       Past Medical History:   Diagnosis Date    AA (alcohol abuse)     in past    Arthritis     Cancer     Essential tremor     [place] (hard of hearing)     Neuropathy     Sleep apnea     does not use CPAP    Waldenstrom's macroglobulinemia      Past Surgical History:   Procedure Laterality Date    APPENDECTOMY      Bilateral VIM DBSand left chest IPG  07/02/2013    Dr. Francisco    CATARACT EXTRACTION Bilateral     COLONOSCOPY      DEEP BRAIN STIMULATOR PLACEMENT N/A 6/7/2022    Procedure: INSERTION, DEEP BRAIN STIMULATOR;  Surgeon: Kieran Saenz MD;  Location: [facility];  Service: Neurosurgery;  Laterality: N/A;  Revision of right VIM DBS  "electrode, possible bilateral // NII AND CRISTIN //  VA auth for surgery in chart    INSERTION OF DEEP BRAIN STIMULATOR GENERATOR N/A 6/21/2022    Procedure: INSERTION, PULSE GENERATOR, DEEP BRAIN STIMULATOR;  Surgeon: Kieran Saenz MD;  Location: [facility] OR;  Service: Neurosurgery;  Laterality: N/A;    RETINAL DETACHMENT SURGERY Bilateral     revision DBS electrode  06/07/2022    revision of right VIM DBS lead Right 09/15/2015    Dr. Francisco    TONSILLECTOMY  1959    TUMOR EXCISION  2008     Family History    None       Social History     Socioeconomic History    Marital status:    Tobacco Use    Smoking status: Never Smoker    Smokeless tobacco: Never Used   Substance and Sexual Activity    Alcohol use: Yes     Comment: rarely    Drug use: Never       Review of systems:    Pertinent items are noted in HPI.      Vital Signs  Temp: 97.5 °F (36.4 °C)  Pulse: 74  Resp: 16  BP: 125/70  Pain Score:   3  Height: 5' 10" (177.8 cm)  Weight: 73 kg (161 lb)  Body mass index is 23.1 kg/m².      Physical Exam:    General:  Pleasant. Well-nourished. Alert.    Head:  Normocephalic, without obvious abnormality, atraumatic    left chest wall incision:  C/D/I  Wound edges well-approximated  healing well    Scalp incisions:  C/D/I  Wound edges well-approximated  Staples removed without problems    Lungs:   Breathing is quiet, non-lablored    Neurological:    Oriented to Person, Place, Time   Speech:  normal  Memory, cognition, and affect are appropriate.  Extraocular movements are intact.  Movements of facial expression are intact and symmetric.    Motor Strength: Moves all extremities spontaneously with good tone.  No abnormal movements seen.  tremor noted, description: present at rest and involves bilateral upper extremities    Gait:  normal        ASSESSMENT:     1. Benign essential tremor     -Instructed to leave glue on left chest incision for one more week.  -Follow up as needed.        IDr. Kieran, " personally performed the services described in this documentation. All medical record entries made by the scribe, Silke Gallegos, were at my direction and in my presence.  I have reviewed the chart and agree that the record reflects my personal performance and is accurate and complete. Kieran Saenz MD.  5:00 PM 06/29/2022           Kieran Saenz MD FACS [facility]

## 2022-06-29 ENCOUNTER — OFFICE VISIT (OUTPATIENT)
Dept: NEUROSURGERY | Facility: CLINIC | Age: 72
End: 2022-06-29
Payer: OTHER GOVERNMENT

## 2022-06-29 VITALS
WEIGHT: 161 LBS | SYSTOLIC BLOOD PRESSURE: 125 MMHG | TEMPERATURE: 98 F | HEART RATE: 74 BPM | HEIGHT: 70 IN | RESPIRATION RATE: 16 BRPM | DIASTOLIC BLOOD PRESSURE: 70 MMHG | BODY MASS INDEX: 23.05 KG/M2

## 2022-06-29 DIAGNOSIS — G25.0 BENIGN ESSENTIAL TREMOR: Primary | ICD-10-CM

## 2022-06-29 PROCEDURE — 99024 PR POST-OP FOLLOW-UP VISIT: ICD-10-PCS | Mod: ,,, | Performed by: NEUROLOGICAL SURGERY

## 2022-06-29 PROCEDURE — 99024 POSTOP FOLLOW-UP VISIT: CPT | Mod: ,,, | Performed by: NEUROLOGICAL SURGERY

## 2022-07-05 ENCOUNTER — OFFICE VISIT (OUTPATIENT)
Dept: NEUROLOGY | Facility: CLINIC | Age: 72
End: 2022-07-05
Payer: OTHER GOVERNMENT

## 2022-07-05 VITALS — WEIGHT: 177 LBS | BODY MASS INDEX: 25.34 KG/M2 | HEIGHT: 70 IN

## 2022-07-05 DIAGNOSIS — G25.0 BENIGN ESSENTIAL TREMOR: ICD-10-CM

## 2022-07-05 DIAGNOSIS — G25.0 ESSENTIAL TREMOR: Primary | ICD-10-CM

## 2022-07-05 DIAGNOSIS — Z96.89 S/P DEEP BRAIN STIMULATOR PLACEMENT: ICD-10-CM

## 2022-07-05 PROCEDURE — 99999 PR PBB SHADOW E&M-EST. PATIENT-LVL III: ICD-10-PCS | Mod: PBBFAC,,, | Performed by: NURSE PRACTITIONER

## 2022-07-05 PROCEDURE — 95977 PR ELEC ANALYSIS, IMPLT NEURO PULSE GEN, W/PRGRM, CMPLX CRAN NERVE: ICD-10-PCS | Mod: S$PBB,,, | Performed by: NURSE PRACTITIONER

## 2022-07-05 PROCEDURE — 95977 ALYS CPLX CN NPGT PRGRMG: CPT | Mod: PBBFAC | Performed by: NURSE PRACTITIONER

## 2022-07-05 PROCEDURE — 99214 OFFICE O/P EST MOD 30 MIN: CPT | Mod: 25,S$PBB,, | Performed by: NURSE PRACTITIONER

## 2022-07-05 PROCEDURE — 95977 ALYS CPLX CN NPGT PRGRMG: CPT | Mod: S$PBB,,, | Performed by: NURSE PRACTITIONER

## 2022-07-05 PROCEDURE — 99999 PR PBB SHADOW E&M-EST. PATIENT-LVL III: CPT | Mod: PBBFAC,,, | Performed by: NURSE PRACTITIONER

## 2022-07-05 PROCEDURE — 99214 PR OFFICE/OUTPT VISIT, EST, LEVL IV, 30-39 MIN: ICD-10-PCS | Mod: 25,S$PBB,, | Performed by: NURSE PRACTITIONER

## 2022-07-05 PROCEDURE — 99213 OFFICE O/P EST LOW 20 MIN: CPT | Mod: PBBFAC,25 | Performed by: NURSE PRACTITIONER

## 2022-07-05 NOTE — PROGRESS NOTES
Subjective:       Patient ID: Chandu Sosa is a 71 y.o. male.    Chief Complaint: FU for DBS programming following replacement surgery    HPI:            Patient had recent DBS surgery; implantation of Percept PC on 06/21/2022; B VIM    He has not noted any worsening of symptoms and is here for initial DBS programming      ROS: as per HPI, otherwise pertinent systems review is negative          Past Medical History:   Diagnosis Date    AA (alcohol abuse)     in past    Arthritis     Cancer     Essential tremor     [place] (hard of hearing)     Neuropathy     Sleep apnea     does not use CPAP    Waldenstrom's macroglobulinemia        Past Surgical History:   Procedure Laterality Date    APPENDECTOMY      Bilateral VIM DBSand left chest IPG  07/02/2013    Dr. Francisco    CATARACT EXTRACTION Bilateral     COLONOSCOPY      DEEP BRAIN STIMULATOR PLACEMENT N/A 6/7/2022    Procedure: INSERTION, DEEP BRAIN STIMULATOR;  Surgeon: Kieran Saenz MD;  Location: [facility] OR;  Service: Neurosurgery;  Laterality: N/A;  Revision of right VIM DBS electrode, possible bilateral // NII AND CRISTIN //  VA auth for surgery in chart    INSERTION OF DEEP BRAIN STIMULATOR GENERATOR N/A 6/21/2022    Procedure: INSERTION, PULSE GENERATOR, DEEP BRAIN STIMULATOR;  Surgeon: Kieran Saenz MD;  Location: [facility] OR;  Service: Neurosurgery;  Laterality: N/A;    RETINAL DETACHMENT SURGERY Bilateral     revision DBS electrode  06/07/2022    revision of right VIM DBS lead Right 09/15/2015    Dr. Francisco    TONSILLECTOMY  1959    TUMOR EXCISION  2008       History reviewed. No pertinent family history.    Social History     Socioeconomic History    Marital status:    Tobacco Use    Smoking status: Never Smoker    Smokeless tobacco: Never Used   Substance and Sexual Activity    Alcohol use: Yes     Comment: rarely    Drug use: Never       Review of patient's allergies indicates:   Allergen Reactions    Bee sting kit Hives and  "Shortness Of Breath         Current Outpatient Medications:     alpha lipoic acid 600 mg Cap, Take by mouth Daily., Disp: , Rfl:     magnesium oxide-Mg AA chelate (MG-PLUS-PROTEIN) 133 mg Tab, Take 200 mg by mouth Daily., Disp: , Rfl:     TUMERIC-GING-OLIVE-OREG-CAPRYL ORAL, Take 1 tablet by mouth once daily., Disp: , Rfl:     zinc gluconate 50 mg tablet, Take 50 mg by mouth once daily., Disp: , Rfl:     tamsulosin (FLOMAX) 0.4 mg Cap, Take 2 capsules (0.8 mg total) by mouth every evening. (Patient not taking: Reported on 7/5/2022), Disp: 60 capsule, Rfl: 11         Objective:        Exam:   Ht 5' 10" (1.778 m)   Wt 80.3 kg (177 lb)   BMI 25.40 kg/m²     Physical Exam  Vitals reviewed.   Constitutional:       Appearance: Normal appearance.      Accompanied by wife  HENT:      Ears:      Comments: [place]; B hearing aids  Eyes:      Extraocular Movements: Extraocular movements intact.   Cardiovascular:      Rate and Rhythm: Normal rate and regular rhythm.   Pulmonary:      Effort: Pulmonary effort is normal.      Breath sounds: Normal breath sounds.   Musculoskeletal:         General: Normal range of motion.   Skin:     General: Skin is warm and dry.   Neurological:      General: No focal deficit present.      Mental Status: He is alert and oriented to person, place, and time.      BUE action tremor  Psychiatric:         Mood and Affect: Mood normal.         Behavior: Behavior normal.      Incision site top of head has a scab measuring 1/4 inch appears to be in healing stages, looks more along the lines of a puncture. No draining and no redness.       DBS programming done per Guicho [Medtronic rep]: final settings   L VIM  1-  2-  0.9 ma, 70 ms, 130 hz  R VIM  9-  10-  1.9 ma, 80 ms, 130 hz    Sebastian 99%      Assessment/Plan:     Problem List Items Addressed This Visit        Neuro    Essential tremor - Primary    S/P deep brain stimulator placement    Current Assessment & Plan       DBS programming done per Guicho " [[employer] rep]: final settings   L VIM  1-  2-  0.9 ma, 70 ms, 130 hz  R VIM  9-  10-  1.9 ma, 80 ms, 130 hz    Sebastian 99%    Total time: 1 hour     I asked that he call Dr. Saenz's office to report the status of his surgical site located at the top of his head; they may opt to examine for themselves.                   Miguel Napier, MSN, APRN, AGACNP-BC

## 2022-07-05 NOTE — ASSESSMENT & PLAN NOTE
I asked that he call Dr. Saenz's office to report the status of his surgical site located at the top of his head; they may opt to examine for themselves.

## 2022-07-06 ENCOUNTER — OFFICE VISIT (OUTPATIENT)
Dept: NEUROSURGERY | Facility: CLINIC | Age: 72
End: 2022-07-06
Payer: OTHER GOVERNMENT

## 2022-07-06 VITALS
HEIGHT: 70 IN | RESPIRATION RATE: 20 BRPM | WEIGHT: 177 LBS | SYSTOLIC BLOOD PRESSURE: 117 MMHG | DIASTOLIC BLOOD PRESSURE: 62 MMHG | HEART RATE: 75 BPM | BODY MASS INDEX: 25.34 KG/M2

## 2022-07-06 DIAGNOSIS — G25.0 ESSENTIAL TREMOR: ICD-10-CM

## 2022-07-06 DIAGNOSIS — Z96.89 S/P DEEP BRAIN STIMULATOR PLACEMENT: Primary | ICD-10-CM

## 2022-07-06 PROBLEM — C88.0 WALDENSTROM'S MACROGLOBULINEMIA: Status: RESOLVED | Noted: 2022-05-30 | Resolved: 2022-07-06

## 2022-07-06 PROBLEM — G62.9 POLYNEUROPATHY: Status: RESOLVED | Noted: 2022-05-27 | Resolved: 2022-07-06

## 2022-07-06 PROCEDURE — 99024 PR POST-OP FOLLOW-UP VISIT: ICD-10-PCS | Mod: ,,, | Performed by: PHYSICIAN ASSISTANT

## 2022-07-06 PROCEDURE — 99024 POSTOP FOLLOW-UP VISIT: CPT | Mod: ,,, | Performed by: PHYSICIAN ASSISTANT

## 2022-07-06 NOTE — PROGRESS NOTES
[facility]  History & Physical  Neurosurgery    SUBJECTIVE:     History of Present Illness:   Patient is a 71 y.o. male is seen today for wound check.  On 6/21/2022, the patient underwent implantation of IPG for DBS with removal and replacement of the extension leads.  He was seen by Dr. Camargo yesterday.  They were concerned about one of the incisions being red.  The patient presents today for evaluation.    Overall, the patient is doing well.  The generator was turned on and programmed yesterday.  He is pleased with the results so far.  There have been no fevers or chills.  There has been no drainage from the wound.  He notes that he stopped touching the area after Dr. Camargo were concerned with it yesterday.  The area itches at times.      Patient Active Problem List    Diagnosis Date Noted    S/P deep brain stimulator placement 07/05/2022    Waldenstrom's macroglobulinemia 05/30/2022    Polyneuropathy 05/27/2022    Essential tremor 05/27/2022     Past Medical History:   Diagnosis Date    AA (alcohol abuse)     in past    Arthritis     Cancer     Essential tremor     [place] (hard of hearing)     Neuropathy     Sleep apnea     does not use CPAP    Waldenstrom's macroglobulinemia       Past Surgical History:   Procedure Laterality Date    APPENDECTOMY      Bilateral VIM DBSand left chest IPG  07/02/2013    Dr. Francisco    CATARACT EXTRACTION Bilateral     COLONOSCOPY      DEEP BRAIN STIMULATOR PLACEMENT N/A 6/7/2022    Procedure: INSERTION, DEEP BRAIN STIMULATOR;  Surgeon: Kieran Saenz MD;  Location: [facility] OR;  Service: Neurosurgery;  Laterality: N/A;  Revision of right VIM DBS electrode, possible bilateral // NII AND CRISTIN //  VA auth for surgery in chart    INSERTION OF DEEP BRAIN STIMULATOR GENERATOR N/A 6/21/2022    Procedure: INSERTION, PULSE GENERATOR, DEEP BRAIN STIMULATOR;  Surgeon: Kieran Saenz MD;  Location: [facility] OR;  Service: Neurosurgery;  Laterality: N/A;    RETINAL  "DETACHMENT SURGERY Bilateral     revision DBS electrode  06/07/2022    revision of right VIM DBS lead Right 09/15/2015    Dr. Francisco    TONSILLECTOMY  1959    TUMOR EXCISION  2008        Review of patient's allergies indicates:   Allergen Reactions    Bee sting kit Hives and Shortness Of Breath      Outpatient Medications as of 7/6/2022   Medication Sig Dispense Refill    alpha lipoic acid 600 mg Cap Take by mouth Daily.      magnesium oxide-Mg AA chelate (MG-PLUS-PROTEIN) 133 mg Tab Take 200 mg by mouth Daily.      tamsulosin (FLOMAX) 0.4 mg Cap Take 2 capsules (0.8 mg total) by mouth every evening. (Patient not taking: Reported on 7/5/2022) 60 capsule 11    TUMERIC-GING-OLIVE-OREG-CAPRYL ORAL Take 1 tablet by mouth once daily.      zinc gluconate 50 mg tablet Take 50 mg by mouth once daily.       No current facility-administered medications on file as of 7/6/2022.         Social History     Tobacco Use    Smoking status: Never Smoker    Smokeless tobacco: Never Used   Substance Use Topics    Alcohol use: Yes     Comment: rarely      History reviewed. No pertinent family history.     ROS:    Review of Systems   Constitutional: Negative for chills and fever.   HENT: Negative for nosebleeds and sore throat.    Eyes: Negative for pain and visual disturbance.   Respiratory: Negative for cough, chest tightness and shortness of breath.    Cardiovascular: Negative for chest pain.   Gastrointestinal: Negative for diarrhea, nausea and vomiting.   Genitourinary: Negative for difficulty urinating, dysuria and hematuria.   Musculoskeletal: Negative for gait problem and myalgias.   Skin: Negative for rash.   Neurological: Negative for dizziness, facial asymmetry and headaches.   Psychiatric/Behavioral: Negative for confusion and sleep disturbance. The patient is not nervous/anxious.        OBJECTIVE:     /62 (BP Location: Other (Comment), Patient Position: Sitting)   Pulse 75   Resp 20   Ht 5' 10" (1.778 " m)   Wt 80.3 kg (177 lb)   BMI 25.40 kg/m²     General:  healthy, alert, no distress     Head:  Normocephalic, without obvious abnormality, atraumatic    Lungs:   Breathing is quiet, non-lablored    Neurological:  Incision healing well.  The most superior incision has scabbing without redness or erythema.   Oriented to Person, Place, Time, and situation.  Memory, cognition, and affect are appropriate.  Moderate tremor right greater than left upper extremity.  Gait is within normal limits.      ASSESSMENT/PLAN:     1. S/P deep brain stimulator placement     2. Essential tremor         The patient is 15 days out of surgery.  Although there is scabbing at the superior frontal incision, there is no redness.  It will heal fine.  The patient will continue to work with Dr. Camargo.  He will return on an as needed basis.

## 2022-07-20 ENCOUNTER — PATIENT MESSAGE (OUTPATIENT)
Dept: ADMINISTRATIVE | Facility: OTHER | Age: 72
End: 2022-07-20
Payer: OTHER GOVERNMENT

## 2022-07-21 ENCOUNTER — OFFICE VISIT (OUTPATIENT)
Dept: NEUROLOGY | Facility: CLINIC | Age: 72
End: 2022-07-21
Payer: OTHER GOVERNMENT

## 2022-07-21 ENCOUNTER — PATIENT MESSAGE (OUTPATIENT)
Dept: NEUROLOGY | Facility: CLINIC | Age: 72
End: 2022-07-21

## 2022-07-21 VITALS
BODY MASS INDEX: 25.34 KG/M2 | SYSTOLIC BLOOD PRESSURE: 128 MMHG | HEIGHT: 70 IN | WEIGHT: 177 LBS | DIASTOLIC BLOOD PRESSURE: 62 MMHG

## 2022-07-21 DIAGNOSIS — G25.0 ESSENTIAL TREMOR: Primary | ICD-10-CM

## 2022-07-21 DIAGNOSIS — Z96.89 S/P DEEP BRAIN STIMULATOR PLACEMENT: ICD-10-CM

## 2022-07-21 PROCEDURE — 99213 OFFICE O/P EST LOW 20 MIN: CPT | Mod: PBBFAC | Performed by: NURSE PRACTITIONER

## 2022-07-21 PROCEDURE — 99999 PR PBB SHADOW E&M-EST. PATIENT-LVL III: ICD-10-PCS | Mod: PBBFAC,,, | Performed by: NURSE PRACTITIONER

## 2022-07-21 PROCEDURE — 95977 ALYS CPLX CN NPGT PRGRMG: CPT | Mod: PBBFAC | Performed by: NURSE PRACTITIONER

## 2022-07-21 PROCEDURE — 95977 ALYS CPLX CN NPGT PRGRMG: CPT | Mod: S$PBB,,, | Performed by: NURSE PRACTITIONER

## 2022-07-21 PROCEDURE — 99999 PR PBB SHADOW E&M-EST. PATIENT-LVL III: CPT | Mod: PBBFAC,,, | Performed by: NURSE PRACTITIONER

## 2022-07-21 PROCEDURE — 99215 PR OFFICE/OUTPT VISIT, EST, LEVL V, 40-54 MIN: ICD-10-PCS | Mod: 25,S$PBB,, | Performed by: NURSE PRACTITIONER

## 2022-07-21 PROCEDURE — 95977 PR ELEC ANALYSIS, IMPLT NEURO PULSE GEN, W/PRGRM, CMPLX CRAN NERVE: ICD-10-PCS | Mod: S$PBB,,, | Performed by: NURSE PRACTITIONER

## 2022-07-21 PROCEDURE — 99215 OFFICE O/P EST HI 40 MIN: CPT | Mod: 25,S$PBB,, | Performed by: NURSE PRACTITIONER

## 2022-07-21 RX ORDER — PROPRANOLOL HYDROCHLORIDE 10 MG/1
10 TABLET ORAL 2 TIMES DAILY
Qty: 60 TABLET | Refills: 11 | Status: SHIPPED | OUTPATIENT
Start: 2022-07-21 | End: 2022-09-21

## 2022-07-21 NOTE — ASSESSMENT & PLAN NOTE
Complex programming done today.    Total time: 1 hour    Final settings:  L VIM 1-  2-  0.9ma, 70 ms, 175hz  R VIM 10-  11-  A1.9 B0.6 C0.6 [5.7ma] 90ms, 175 hz

## 2022-07-21 NOTE — PROGRESS NOTES
"Subjective:       Patient ID: Chandu Sosa is a 71 y.o. male.    Chief Complaint: Deep Brain Stimulation     HPI:            Reports that since DBS programming, feels tremor at improvement at beginning, but now "back to what it was"; reports however, that he has noticed improvements "in other aspects"    Did have one fall: stepped on his own foot when turning; denies shuffling/ freezing.    He is R hand dominant      ROS: as per HPI, otherwise pertinent systems review is negative          Past Medical History:   Diagnosis Date    AA (alcohol abuse)     in past    Arthritis     Cancer     Essential tremor     [place] (hard of hearing)     Neuropathy     Polyneuropathy 5/27/2022    Sleep apnea     does not use CPAP    Waldenstrom's macroglobulinemia        Past Surgical History:   Procedure Laterality Date    APPENDECTOMY      Bilateral VIM DBSand left chest IPG  07/02/2013    Dr. Francisco    CATARACT EXTRACTION Bilateral     COLONOSCOPY      DEEP BRAIN STIMULATOR PLACEMENT N/A 6/7/2022    Procedure: INSERTION, DEEP BRAIN STIMULATOR;  Surgeon: Kieran Saenz MD;  Location: [facility] OR;  Service: Neurosurgery;  Laterality: N/A;  Revision of right VIM DBS electrode, possible bilateral // NII AND CRISTIN //  VA auth for surgery in chart    INSERTION OF DEEP BRAIN STIMULATOR GENERATOR N/A 6/21/2022    Procedure: INSERTION, PULSE GENERATOR, DEEP BRAIN STIMULATOR;  Surgeon: Kieran Saenz MD;  Location: [facility] OR;  Service: Neurosurgery;  Laterality: N/A;    RETINAL DETACHMENT SURGERY Bilateral     revision DBS electrode  06/07/2022    revision of right VIM DBS lead Right 09/15/2015    Dr. Francisco    TONSILLECTOMY  1959    TUMOR EXCISION  2008       History reviewed. No pertinent family history.    Social History     Socioeconomic History    Marital status:    Tobacco Use    Smoking status: Never Smoker    Smokeless tobacco: Never Used   Substance and Sexual Activity    Alcohol use: Yes     Comment: " "rarely    Drug use: Never       Review of patient's allergies indicates:   Allergen Reactions    Bee sting kit Hives and Shortness Of Breath         Current Outpatient Medications:     alpha lipoic acid 600 mg Cap, Take by mouth Daily., Disp: , Rfl:     magnesium oxide-Mg AA chelate (MG-PLUS-PROTEIN) 133 mg Tab, Take 200 mg by mouth Daily., Disp: , Rfl:     TUMERIC-GING-OLIVE-OREG-CAPRYL ORAL, Take 1 tablet by mouth once daily., Disp: , Rfl:     zinc gluconate 50 mg tablet, Take 50 mg by mouth once daily., Disp: , Rfl:          Objective:        Exam:   /62   Ht 5' 10" (1.778 m)   Wt 80.3 kg (177 lb)   BMI 25.40 kg/m²     Physical Exam  Vitals reviewed.   Constitutional:       Appearance: Normal appearance.      Accompanied by wife  OSVALDO:      Ears:      Comments: [place]; B hearing aids  Eyes:      Extraocular Movements: Extraocular movements intact.   Cardiovascular:      Rate and Rhythm: Normal rate and regular rhythm.   Pulmonary:      Effort: Pulmonary effort is normal.      Breath sounds: Normal breath sounds.   Musculoskeletal:         General: Normal range of motion.   Skin:     General: Skin is warm and dry.   Neurological:      General: No focal deficit present.      Mental Status: He is alert and oriented to person, place, and time.      BUE action tremor L>R  Psychiatric:         Mood and Affect: Mood normal.         Behavior: Behavior normal.        DBS programming done: final settings   L VIM  1-  2-  0.9 ma, 70 ms, 175 hz  R VIM 10-  11-  A1.9 B0.6 C0.6 [5.7ma] 90ms, 175 hz     Sebastian 99%    Dr. Camargo physically present in exam room during patient encounter.         Assessment/Plan:     Problem List Items Addressed This Visit        Neuro    Essential tremor - Primary    Current Assessment & Plan     Start Propranolol IR 10 mg tab, 1 tab every morning for a week then 1 tab every morning and at lunch thereafter.    Medication administration personally reviewed with patient by MD/provider. " Potential or actual medication changes discussed. Common and potentially serious side effects of medications or medication changes discussed.    FU in 3-4 weeks           S/P deep brain stimulator placement    Current Assessment & Plan     Complex programming done today.    Total time: 1 hour    Final settings:  L VIM 1-  2-  0.9ma, 70 ms, 175hz  R VIM 10-  11-  A1.9 B0.6 C0.6 [5.7ma] 90ms, 175 hz    At above settings, severity of LUE tremor was improved but not resolved.    We attempted several settings; when attempting to optimize the L VIM settings by initializing directional settings, he did not tolerate - was foggy headed and dizzy and had L facial paresthesias therefore we reverted back to original settings as recorded above.                 Miguel Napier, MSN, APRN, AGACNP-BC

## 2022-07-21 NOTE — ASSESSMENT & PLAN NOTE
Start Propranolol IR 10 mg tab, 1 tab every morning for a week then 1 tab every morning and at lunch thereafter.    Medication administration personally reviewed with patient by MD/provider. Potential or actual medication changes discussed. Common and potentially serious side effects of medications or medication changes discussed.    FU in 3-4 weeks

## 2022-07-22 ENCOUNTER — PATIENT MESSAGE (OUTPATIENT)
Dept: NEUROLOGY | Facility: CLINIC | Age: 72
End: 2022-07-22
Payer: OTHER GOVERNMENT

## 2022-07-25 ENCOUNTER — PATIENT MESSAGE (OUTPATIENT)
Dept: NEUROLOGY | Facility: CLINIC | Age: 72
End: 2022-07-25
Payer: OTHER GOVERNMENT

## 2022-09-21 ENCOUNTER — OFFICE VISIT (OUTPATIENT)
Dept: NEUROLOGY | Facility: CLINIC | Age: 72
End: 2022-09-21
Payer: OTHER GOVERNMENT

## 2022-09-21 VITALS
SYSTOLIC BLOOD PRESSURE: 118 MMHG | HEIGHT: 70 IN | DIASTOLIC BLOOD PRESSURE: 72 MMHG | BODY MASS INDEX: 25.48 KG/M2 | WEIGHT: 178 LBS

## 2022-09-21 DIAGNOSIS — G25.0 ESSENTIAL TREMOR: Primary | ICD-10-CM

## 2022-09-21 DIAGNOSIS — Z96.89 STATUS POST DEEP BRAIN STIMULATOR PLACEMENT: ICD-10-CM

## 2022-09-21 PROCEDURE — 95983 ALYS BRN NPGT PRGRMG 15 MIN: CPT | Mod: S$PBB,,, | Performed by: SPECIALIST

## 2022-09-21 PROCEDURE — 99213 PR OFFICE/OUTPT VISIT, EST, LEVL III, 20-29 MIN: ICD-10-PCS | Mod: 25,S$PBB,, | Performed by: SPECIALIST

## 2022-09-21 PROCEDURE — 95984 ALYS BRN NPGT PRGRMG ADDL 15: CPT | Mod: PBBFAC | Performed by: SPECIALIST

## 2022-09-21 PROCEDURE — 99999 PR PBB SHADOW E&M-EST. PATIENT-LVL III: ICD-10-PCS | Mod: PBBFAC,,, | Performed by: SPECIALIST

## 2022-09-21 PROCEDURE — 99999 PR PBB SHADOW E&M-EST. PATIENT-LVL III: CPT | Mod: PBBFAC,,, | Performed by: SPECIALIST

## 2022-09-21 PROCEDURE — 95983 PR ELEC ANALYSIS, IMPLT NEURO PULSE GEN, W/PRGRM, BRAIN, 1ST 15 MINS: ICD-10-PCS | Mod: S$PBB,,, | Performed by: SPECIALIST

## 2022-09-21 PROCEDURE — 95984 PR ELEC ANALYSIS, IMPLT NEURO PULSE GEN, W/PRGRM, BRAIN,  EA ADDTL 15 MINS: ICD-10-PCS | Mod: S$PBB,,, | Performed by: SPECIALIST

## 2022-09-21 PROCEDURE — 95984 ALYS BRN NPGT PRGRMG ADDL 15: CPT | Mod: S$PBB,,, | Performed by: SPECIALIST

## 2022-09-21 PROCEDURE — 99213 OFFICE O/P EST LOW 20 MIN: CPT | Mod: 25,S$PBB,, | Performed by: SPECIALIST

## 2022-09-21 PROCEDURE — 95983 ALYS BRN NPGT PRGRMG 15 MIN: CPT | Mod: PBBFAC | Performed by: SPECIALIST

## 2022-09-21 PROCEDURE — 99213 OFFICE O/P EST LOW 20 MIN: CPT | Mod: PBBFAC | Performed by: SPECIALIST

## 2022-09-21 NOTE — PROGRESS NOTES
"Subjective:         Patient ID: Chandu Sosa is a 71 y.o. male.    Chief Complaint: Ess tremor / DBS follow up     HPI:           f/u DBS (Here for DBS f/u//Pt reports worsening of balance; tremors worse at times. Mild diff swallowing or choking; some speech diff. Did not start propranolol 10 mg daily ordered last visit; states he has tried this in past and was not helpful. )    notes may also be on facesheet for HPI, ROS, and other sections     Review of Systems  See above          Social History     Socioeconomic History    Marital status:    Tobacco Use    Smoking status: Never    Smokeless tobacco: Never   Substance and Sexual Activity    Alcohol use: Yes     Comment: rarely    Drug use: Never         Current Outpatient Medications:     alpha lipoic acid 600 mg Cap, Take by mouth Daily., Disp: , Rfl:     TUMERIC-GING-OLIVE-OREG-CAPRYL ORAL, Take 1 tablet by mouth once daily., Disp: , Rfl:      Objective:      Exam  /72 (BP Location: Left arm, Patient Position: Sitting)   Ht 5' 10" (1.778 m)   Wt 80.7 kg (178 lb)   BMI 25.54 kg/m²     General:   If accompanied, by:_ w   heart__    Neurological    Speech: normal   vis fields:  EOMs: normal   motor  coord:   Gait: wide based  cane    Tremor: intractable BUE postural and action tremor but slightly better after extensive programming today       Neuroimaging:  My comments:     Labs:    meds:    DBS:   Query time:    _._15 min    Programming time:    Addn ._15 minutes     Settings:             Assessment/Plan:       Problem List Items Addressed This Visit          Neuro    Essential tremor - Primary    Status post deep brain stimulator placement       Other comments/ follow up:            Aim follow up _ months    Terry Camargo MD JOSIANE            "

## 2022-09-22 ENCOUNTER — HISTORICAL (OUTPATIENT)
Dept: ADMINISTRATIVE | Facility: HOSPITAL | Age: 72
End: 2022-09-22
Payer: OTHER GOVERNMENT

## 2022-10-10 ENCOUNTER — PATIENT MESSAGE (OUTPATIENT)
Dept: HEMATOLOGY/ONCOLOGY | Facility: CLINIC | Age: 72
End: 2022-10-10
Payer: OTHER GOVERNMENT

## 2022-10-10 DIAGNOSIS — C88.0 WALDENSTROM MACROGLOBULINEMIA: Primary | ICD-10-CM

## 2022-10-25 DIAGNOSIS — C88.0 WALDENSTROM MACROGLOBULINEMIA: Primary | ICD-10-CM

## 2022-11-03 ENCOUNTER — PATIENT MESSAGE (OUTPATIENT)
Dept: NEUROLOGY | Facility: CLINIC | Age: 72
End: 2022-11-03
Payer: OTHER GOVERNMENT

## 2022-11-08 ENCOUNTER — LAB VISIT (OUTPATIENT)
Dept: LAB | Facility: HOSPITAL | Age: 72
End: 2022-11-08
Attending: INTERNAL MEDICINE
Payer: COMMERCIAL

## 2022-11-08 DIAGNOSIS — C88.0 WALDENSTROM MACROGLOBULINEMIA: ICD-10-CM

## 2022-11-08 LAB
ALBUMIN SERPL-MCNC: 4.3 GM/DL (ref 3.4–4.8)
ALBUMIN/GLOB SERPL: 1.4 RATIO (ref 1.1–2)
ALP SERPL-CCNC: 72 UNIT/L (ref 40–150)
ALT SERPL-CCNC: 17 UNIT/L (ref 0–55)
AST SERPL-CCNC: 18 UNIT/L (ref 5–34)
B2 MICROGLOB SERPL-MCNC: 1.49 MG/L
BASOPHILS # BLD AUTO: 0.02 X10(3)/MCL (ref 0–0.2)
BASOPHILS NFR BLD AUTO: 0.3 %
BILIRUBIN DIRECT+TOT PNL SERPL-MCNC: 1.2 MG/DL
BUN SERPL-MCNC: 16.8 MG/DL (ref 8.4–25.7)
CALCIUM SERPL-MCNC: 9.7 MG/DL (ref 8.8–10)
CHLORIDE SERPL-SCNC: 100 MMOL/L (ref 98–107)
CO2 SERPL-SCNC: 29 MMOL/L (ref 23–31)
CREAT SERPL-MCNC: 0.75 MG/DL (ref 0.73–1.18)
EOSINOPHIL # BLD AUTO: 0.19 X10(3)/MCL (ref 0–0.9)
EOSINOPHIL NFR BLD AUTO: 2.9 %
ERYTHROCYTE [DISTWIDTH] IN BLOOD BY AUTOMATED COUNT: 12.7 % (ref 11.5–17)
GFR SERPLBLD CREATININE-BSD FMLA CKD-EPI: >60 MLS/MIN/1.73/M2
GLOBULIN SER-MCNC: 3.1 GM/DL (ref 2.4–3.5)
GLUCOSE SERPL-MCNC: 84 MG/DL (ref 82–115)
HCT VFR BLD AUTO: 41.8 % (ref 42–52)
HGB BLD-MCNC: 13.7 GM/DL (ref 14–18)
IGA SERPL-MCNC: 110 MG/DL (ref 101–645)
IGG SERPL-MCNC: 735 MG/DL (ref 540–1822)
IGM SERPL-MCNC: 884 MG/DL (ref 22–240)
IMM GRANULOCYTES # BLD AUTO: 0.01 X10(3)/MCL (ref 0–0.04)
IMM GRANULOCYTES NFR BLD AUTO: 0.2 %
LYMPHOCYTES # BLD AUTO: 1.71 X10(3)/MCL (ref 0.6–4.6)
LYMPHOCYTES NFR BLD AUTO: 26.5 %
MCH RBC QN AUTO: 31.6 PG (ref 27–31)
MCHC RBC AUTO-ENTMCNC: 32.8 MG/DL (ref 33–36)
MCV RBC AUTO: 96.3 FL (ref 80–94)
MONOCYTES # BLD AUTO: 0.64 X10(3)/MCL (ref 0.1–1.3)
MONOCYTES NFR BLD AUTO: 9.9 %
NEUTROPHILS # BLD AUTO: 3.9 X10(3)/MCL (ref 2.1–9.2)
NEUTROPHILS NFR BLD AUTO: 60.2 %
PLATELET # BLD AUTO: 265 X10(3)/MCL (ref 130–400)
PMV BLD AUTO: 9 FL (ref 7.4–10.4)
POTASSIUM SERPL-SCNC: 4.4 MMOL/L (ref 3.5–5.1)
PROT SERPL-MCNC: 7.4 GM/DL (ref 5.8–7.6)
RBC # BLD AUTO: 4.34 X10(6)/MCL (ref 4.7–6.1)
SODIUM SERPL-SCNC: 136 MMOL/L (ref 136–145)
WBC # SPEC AUTO: 6.5 X10(3)/MCL (ref 4.5–11.5)

## 2022-11-08 PROCEDURE — 86146 BETA-2 GLYCOPROTEIN ANTIBODY: CPT

## 2022-11-08 PROCEDURE — 84165 PROTEIN E-PHORESIS SERUM: CPT

## 2022-11-08 PROCEDURE — 83521 IG LIGHT CHAINS FREE EACH: CPT

## 2022-11-08 PROCEDURE — 82784 ASSAY IGA/IGD/IGG/IGM EACH: CPT

## 2022-11-08 PROCEDURE — 80053 COMPREHEN METABOLIC PANEL: CPT

## 2022-11-08 PROCEDURE — 36415 COLL VENOUS BLD VENIPUNCTURE: CPT

## 2022-11-08 PROCEDURE — 85025 COMPLETE CBC W/AUTO DIFF WBC: CPT

## 2022-11-09 LAB
ALBUMIN % SPEP (OHS): 54.07 (ref 48.1–59.5)
ALBUMIN SERPL-MCNC: 3.7 GM/DL (ref 3.4–4.8)
ALBUMIN/GLOB SERPL: 1.2 RATIO (ref 1.1–2)
ALPHA 1 GLOB (OHS): 0.22 GM/DL (ref 0–0.4)
ALPHA 1 GLOB% (OHS): 3.21 (ref 2.3–4.9)
ALPHA 2 GLOB % (OHS): 9.83 (ref 6.9–13)
ALPHA 2 GLOB (OHS): 0.68 GM/DL (ref 0.4–1)
BETA GLOB (OHS): 0.92 GM/DL (ref 0.7–1.3)
BETA GLOB% (OHS): 13.29 (ref 13.8–19.7)
GAMMA GLOBULIN % (OHS): 19.6 (ref 10.1–21.9)
GAMMA GLOBULIN (OHS): 1.35 GM/DL (ref 0.4–1.8)
GLOBULIN SER-MCNC: 3.2 GM/DL (ref 2.4–3.5)
M SPIKE % (OHS): 5.62
M SPIKE (OHS): 0.39 GM/DL
PATH REV: NORMAL
PROT SERPL-MCNC: 6.9 GM/DL (ref 5.8–7.6)

## 2022-11-09 RX ORDER — CHOLECALCIFEROL (VITAMIN D3) 25 MCG
25 TABLET ORAL DAILY
COMMUNITY
Start: 2022-09-01 | End: 2022-11-15

## 2022-11-09 RX ORDER — TAMSULOSIN HYDROCHLORIDE 0.4 MG/1
0.4 CAPSULE ORAL DAILY
COMMUNITY
Start: 2022-09-01 | End: 2022-11-15

## 2022-11-10 LAB
KAPPA LC FREE SER-MCNC: 6.59 MG/DL (ref 0.33–1.94)
KAPPA LC FREE/LAMBDA FREE SER: 8.56 {RATIO} (ref 0.26–1.65)
LAMBDA LC FREE SERPL-MCNC: 0.77 MG/DL (ref 0.57–2.63)

## 2022-11-10 NOTE — PROGRESS NOTES
Subjective:       Patient ID: Chandu Sosa is a 72 y.o. male.    Chief Complaint:  Tremors and poor balance    Diagnosis:  Waldenstrom's macroglobulinemia                     Chronic tremor                     COVID-19 unvaccinated     Treatment History  Chlorambucil 8/01 - 9/03  Rituxan x 4 weeks 8/16    Current Treatment:  Observation     Clinical History:  Patient was initially diagnosed with Waldenstrom's macroglobulinemia 8/01 after presenting with lower extremity weakness and numbness.  Workup revealed abnormal protein levels and he was found to have a monoclonal IgM.  He did not have any significant adenopathy.  He was treated initially with chlorambucil every 2 weeks for approximately 2 years and then followed on observation.  He had stable monoclonal protein levels with no evidence of symptomatic disease progression.  He was treated with single agent Rituxan for 4 weeks 8/16 due to symptoms of worsening neuropathy.  He reported significant clinical improvement after his treatment.  Serum IgM level prior to Rituxan therapy was 991 mg/dL and decreased to the 600 range following treatment.  His last follow-up at [facility] 5/17 showed:  IgM 641 mg/dL, IgG 628 mg/dL and IgA 82 mg/dL.  He had a normal CMP and CBC with no significant cytopenias.     He was seen as a new patient [facility] 9[facility]13/17. He relocated to [place] from [place] 6/17 because his son was attending college at .  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  He has an implanted deep brain stimulator for severe tremors of the hands which has effectively controlled his symptoms.  He denied any recent or recurrent infections.  He denied any bone pain.  He reported no fever, chills, night sweats or weight loss.  Laboratory testing at his initial visit showed a monoclonal spike of 0.4 g/dL, identified as IgM kappa. IgM level was 625 mg/dL. IgG was mildly decreased with a normal IgA and normal beta-2  microglobulin. He had no significant anemia or renal insufficiency. He had no evidence of B symptoms or palpable adenopathy on physical exam. Ongoing observation was recommended. He has remained stable without evidence of disease progression to warrant additional treatment.      Interval History  He returns to the office today for a six-month follow-up visit accompanied by his wife.  Since his previous visit, he underwent replacement of a deep brain stimulator and revision of the electrodes 6/22.  He reports no significant change in his chronic tremor and poor balance.  He did fall once at home without any significant injury.  His serum IgM level shows mild interval increase.  He has no signs or symptoms of hyperviscosity.  His other immunoglobulin levels remain normal.  He has no peripheral adenopathy on physical exam.  He has no significant anemia, renal insufficiency or hypercalcemia.    Review of Systems   Constitutional:  Negative for appetite change, fatigue, fever and unexpected weight change.   HENT:  Negative for mouth sores, sore throat and trouble swallowing.    Eyes: Negative.    Respiratory:  Negative for cough, chest tightness and shortness of breath.    Cardiovascular:  Negative for chest pain, palpitations and leg swelling.   Gastrointestinal:  Negative for abdominal distention, abdominal pain, blood in stool, change in bowel habit, constipation, diarrhea, nausea, vomiting and change in bowel habit.   Genitourinary:  Negative for dysuria, frequency and urgency.   Musculoskeletal:  Positive for arthralgias. Negative for back pain.   Integumentary:  Negative for rash and mole/lesion.   Neurological:  Positive for dizziness, tremors, numbness (Grade 1-2 sensory neuropathy of the feet (stocking pattern)), coordination difficulties and coordination difficulties. Negative for weakness and headaches.   Hematological:  Negative for adenopathy. Does not bruise/bleed easily.   Psychiatric/Behavioral: Negative.  "        PMHx:  Waldenstrom's macroglobulinemia, essential tremor, detached retina, peripheral neuropathy, sleep apnea, cataracts, sensorineural hearing loss  PSHx:  Appendectomy, tonsillectomy, ORIF tib-fib fracture, implantation DBS for tremors, retinal surgery, colonoscopy   SH:  Former occasional smoker, quit 1980s.  + daily alcohol use (3-4 beers).  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  Lives in [place] with his wife.    FH:  No family history of carcinoma.     Objective:        BP (!) 147/76 (BP Location: Right arm, Patient Position: Sitting, BP Method: Medium (Automatic))   Pulse 70   Temp 97.8 °F (36.6 °C)   Resp 18   Ht 5' 10" (1.778 m)   Wt 86.6 kg (190 lb 14.4 oz)   SpO2 97%   BMI 27.39 kg/m²    Physical Exam  Constitutional:       Comments: Elderly, well-developed white male in NAD   HENT:      Head: Normocephalic and atraumatic.      Nose: Nose normal.      Mouth/Throat:      Mouth: Mucous membranes are moist.      Pharynx: Oropharynx is clear. No posterior oropharyngeal erythema.   Eyes:      Extraocular Movements: Extraocular movements intact.      Conjunctiva/sclera: Conjunctivae normal.      Pupils: Pupils are equal, round, and reactive to light.   Cardiovascular:      Rate and Rhythm: Normal rate and regular rhythm.      Heart sounds: No murmur heard.  Pulmonary:      Breath sounds: Normal breath sounds.   Abdominal:      General: Bowel sounds are normal. There is no distension.      Palpations: Abdomen is soft. There is no mass.      Tenderness: There is no abdominal tenderness.   Musculoskeletal:         General: No swelling or tenderness. Normal range of motion.      Cervical back: Normal range of motion and neck supple. No tenderness.      Comments: Implanted DBS stimulator left chest wall   Lymphadenopathy:      Cervical: No cervical adenopathy.   Skin:     General: Skin is warm and dry.      Findings: No lesion or rash.   Neurological:      General: No focal deficit present. "      Mental Status: He is alert and oriented to person, place, and time.      Cranial Nerves: No cranial nerve deficit.      Sensory: Sensory deficit present.      Gait: Gait abnormal.      Comments: Bilateral hand tremors     ECOG SCORE    2 - Capable of all selfcare but unable to carry out any work activities, active > 50% of hours          LABORATORY  Recent Results (from the past 168 hour(s))   Immunoglobulins (IgG, IgA, IgM) Quantitative    Collection Time: 11/08/22  1:23 PM   Result Value Ref Range    IgG Level 735.00 540.00 - 1,822.00 mg/dL    IgA Level 110.0 101.0 - 645.0 mg/dL    IgM Level 884.0 (H) 22.0 - 240.0 mg/dL   Immunoglobulin Free LT Chains Blood    Collection Time: 11/08/22  1:23 PM   Result Value Ref Range    Kappa Free Light Chain 6.59 (H) 0.3300 - 1.94 mg/dL    Lambda Free Light Chain 0.7700 0.5700 - 2.63 mg/dL    Kappa/Lambda FLC Ratio 8.56 (H) 0.2600 - 1.65   Immunofixation & Protein Electrophoresis    Collection Time: 11/08/22  1:23 PM   Result Value Ref Range    Protein Total 6.9 5.8 - 7.6 gm/dL    Albumin Level 3.7 3.4 - 4.8 gm/dL    Globulin 3.2 2.4 - 3.5 gm/dL    Albumin/Globulin Ratio 1.2 1.1 - 2.0 ratio    Albumin, SPEP 54.07 48.1 - 59.5    Alpha 1 Glob % 3.21 2.3 - 4.9    Alpha 1 Glob 0.22 0.00 - 0.40 gm/dl    Alpha 2 Glob% 9.83 6.9 - 13    Alpha 2 Glob 0.68 0.40 - 1.00 gm/dl    Beta Glob % 13.29 (L) 13.8 - 19.7    Beta Glob 0.92 0.70 - 1.30 gm/dl    Gamma Globulin % 19.60 10.1 - 21.9    Gamma Globulin 1.35 0.40 - 1.80 gm/dl    M Manny % 5.62     M Manny 0.39 gm/dl   Pathologist Interpretation    Collection Time: 11/08/22  1:23 PM   Result Value Ref Range    Pathology Review       SPEP: A monoclonal protein (M-spike) is present in the gamma region (0.39 g/dL).       MARIANELA:  Immunofixation shows an IgM monoclonal protein with kappa light chain specificity.      Comment:  The patient's clinical of Waldenstrom's macroglobulinemia, IgM kappa, is noted.    Jass Hebert M.D.      Beta-2  Microglobulin, Serum    Collection Time: 11/08/22  1:25 PM   Result Value Ref Range    Beta 2 Microglobin 1.490 mg/L   CBC with Differential    Collection Time: 11/08/22  1:25 PM   Result Value Ref Range    WBC 6.5 4.5 - 11.5 x10(3)/mcL    RBC 4.34 (L) 4.70 - 6.10 x10(6)/mcL    Hgb 13.7 (L) 14.0 - 18.0 gm/dL    Hct 41.8 (L) 42.0 - 52.0 %    MCV 96.3 (H) 80.0 - 94.0 fL    MCH 31.6 (H) 27.0 - 31.0 pg    MCHC 32.8 (L) 33.0 - 36.0 mg/dL    RDW 12.7 11.5 - 17.0 %    Platelet 265 130 - 400 x10(3)/mcL    MPV 9.0 7.4 - 10.4 fL    Neut % 60.2 %    Lymph % 26.5 %    Mono % 9.9 %    Eos % 2.9 %    Basophil % 0.3 %    Lymph # 1.71 0.6 - 4.6 x10(3)/mcL    Neut # 3.9 2.1 - 9.2 x10(3)/mcL    Mono # 0.64 0.1 - 1.3 x10(3)/mcL    Eos # 0.19 0 - 0.9 x10(3)/mcL    Baso # 0.02 0 - 0.2 x10(3)/mcL    IG# 0.01 0 - 0.04 x10(3)/mcL    IG% 0.2 %   Comprehensive Metabolic Panel    Collection Time: 11/08/22  1:26 PM   Result Value Ref Range    Sodium Level 136 136 - 145 mmol/L    Potassium Level 4.4 3.5 - 5.1 mmol/L    Chloride 100 98 - 107 mmol/L    Carbon Dioxide 29 23 - 31 mmol/L    Glucose Level 84 82 - 115 mg/dL    Blood Urea Nitrogen 16.8 8.4 - 25.7 mg/dL    Creatinine 0.75 0.73 - 1.18 mg/dL    Calcium Level Total 9.7 8.8 - 10.0 mg/dL    Protein Total 7.4 5.8 - 7.6 gm/dL    Albumin Level 4.3 3.4 - 4.8 gm/dL    Globulin 3.1 2.4 - 3.5 gm/dL    Albumin/Globulin Ratio 1.4 1.1 - 2.0 ratio    Bilirubin Total 1.2 <=1.5 mg/dL    Alkaline Phosphatase 72 40 - 150 unit/L    Alanine Aminotransferase 17 0 - 55 unit/L    Aspartate Aminotransferase 18 5 - 34 unit/L    eGFR >60 mls/min/1.73/m2              10/07/21   4/14/22   11/08/22  IgM     746          773           884    Assessment:   Waldenstrom's macroglobulinemia  Chronic essential tremor      Plan:   Patient does not have any evidence of symptomatic disease progression despite the mild increase in his serum IgM level over the past year.  Continued observation is recommended.  RTC in 6 months  for a follow-up visit and clinical exam with repeat laboratory.      CLEO BOYCE MD    Other Physicians  Dr. Keshav Faye ([facility])

## 2022-11-15 ENCOUNTER — OFFICE VISIT (OUTPATIENT)
Dept: HEMATOLOGY/ONCOLOGY | Facility: CLINIC | Age: 72
End: 2022-11-15
Payer: COMMERCIAL

## 2022-11-15 VITALS
OXYGEN SATURATION: 97 % | HEART RATE: 70 BPM | HEIGHT: 70 IN | BODY MASS INDEX: 27.33 KG/M2 | SYSTOLIC BLOOD PRESSURE: 147 MMHG | RESPIRATION RATE: 18 BRPM | WEIGHT: 190.88 LBS | DIASTOLIC BLOOD PRESSURE: 76 MMHG | TEMPERATURE: 98 F

## 2022-11-15 DIAGNOSIS — C88.0 WALDENSTROM MACROGLOBULINEMIA: Primary | ICD-10-CM

## 2022-11-15 PROCEDURE — 99999 PR PBB SHADOW E&M-EST. PATIENT-LVL III: CPT | Mod: PBBFAC,,, | Performed by: INTERNAL MEDICINE

## 2022-11-15 PROCEDURE — 1159F MED LIST DOCD IN RCRD: CPT | Mod: CPTII,S$GLB,, | Performed by: INTERNAL MEDICINE

## 2022-11-15 PROCEDURE — 3288F FALL RISK ASSESSMENT DOCD: CPT | Mod: CPTII,S$GLB,, | Performed by: INTERNAL MEDICINE

## 2022-11-15 PROCEDURE — 1101F PR PT FALLS ASSESS DOC 0-1 FALLS W/OUT INJ PAST YR: ICD-10-PCS | Mod: CPTII,S$GLB,, | Performed by: INTERNAL MEDICINE

## 2022-11-15 PROCEDURE — 99214 PR OFFICE/OUTPT VISIT, EST, LEVL IV, 30-39 MIN: ICD-10-PCS | Mod: S$GLB,,, | Performed by: INTERNAL MEDICINE

## 2022-11-15 PROCEDURE — 1101F PT FALLS ASSESS-DOCD LE1/YR: CPT | Mod: CPTII,S$GLB,, | Performed by: INTERNAL MEDICINE

## 2022-11-15 PROCEDURE — 1159F PR MEDICATION LIST DOCUMENTED IN MEDICAL RECORD: ICD-10-PCS | Mod: CPTII,S$GLB,, | Performed by: INTERNAL MEDICINE

## 2022-11-15 PROCEDURE — 3008F PR BODY MASS INDEX (BMI) DOCUMENTED: ICD-10-PCS | Mod: CPTII,S$GLB,, | Performed by: INTERNAL MEDICINE

## 2022-11-15 PROCEDURE — 1126F PR PAIN SEVERITY QUANTIFIED, NO PAIN PRESENT: ICD-10-PCS | Mod: CPTII,S$GLB,, | Performed by: INTERNAL MEDICINE

## 2022-11-15 PROCEDURE — 1126F AMNT PAIN NOTED NONE PRSNT: CPT | Mod: CPTII,S$GLB,, | Performed by: INTERNAL MEDICINE

## 2022-11-15 PROCEDURE — 3077F SYST BP >= 140 MM HG: CPT | Mod: CPTII,S$GLB,, | Performed by: INTERNAL MEDICINE

## 2022-11-15 PROCEDURE — 3078F DIAST BP <80 MM HG: CPT | Mod: CPTII,S$GLB,, | Performed by: INTERNAL MEDICINE

## 2022-11-15 PROCEDURE — 1160F PR REVIEW ALL MEDS BY PRESCRIBER/CLIN PHARMACIST DOCUMENTED: ICD-10-PCS | Mod: CPTII,S$GLB,, | Performed by: INTERNAL MEDICINE

## 2022-11-15 PROCEDURE — 3288F PR FALLS RISK ASSESSMENT DOCUMENTED: ICD-10-PCS | Mod: CPTII,S$GLB,, | Performed by: INTERNAL MEDICINE

## 2022-11-15 PROCEDURE — 3077F PR MOST RECENT SYSTOLIC BLOOD PRESSURE >= 140 MM HG: ICD-10-PCS | Mod: CPTII,S$GLB,, | Performed by: INTERNAL MEDICINE

## 2022-11-15 PROCEDURE — 3008F BODY MASS INDEX DOCD: CPT | Mod: CPTII,S$GLB,, | Performed by: INTERNAL MEDICINE

## 2022-11-15 PROCEDURE — 99214 OFFICE O/P EST MOD 30 MIN: CPT | Mod: S$GLB,,, | Performed by: INTERNAL MEDICINE

## 2022-11-15 PROCEDURE — 99999 PR PBB SHADOW E&M-EST. PATIENT-LVL III: ICD-10-PCS | Mod: PBBFAC,,, | Performed by: INTERNAL MEDICINE

## 2022-11-15 PROCEDURE — 1160F RVW MEDS BY RX/DR IN RCRD: CPT | Mod: CPTII,S$GLB,, | Performed by: INTERNAL MEDICINE

## 2022-11-15 PROCEDURE — 3078F PR MOST RECENT DIASTOLIC BLOOD PRESSURE < 80 MM HG: ICD-10-PCS | Mod: CPTII,S$GLB,, | Performed by: INTERNAL MEDICINE

## 2022-12-14 ENCOUNTER — OFFICE VISIT (OUTPATIENT)
Dept: NEUROLOGY | Facility: CLINIC | Age: 72
End: 2022-12-14
Payer: OTHER GOVERNMENT

## 2022-12-14 VITALS
DIASTOLIC BLOOD PRESSURE: 60 MMHG | WEIGHT: 190 LBS | HEIGHT: 70 IN | SYSTOLIC BLOOD PRESSURE: 122 MMHG | BODY MASS INDEX: 27.2 KG/M2

## 2022-12-14 DIAGNOSIS — R13.19 DYSPHAGIA, NEUROLOGIC: ICD-10-CM

## 2022-12-14 DIAGNOSIS — R26.89 IMPAIRMENT OF BALANCE: ICD-10-CM

## 2022-12-14 DIAGNOSIS — Z96.89 STATUS POST DEEP BRAIN STIMULATOR PLACEMENT: ICD-10-CM

## 2022-12-14 DIAGNOSIS — G25.0 ESSENTIAL TREMOR: Primary | ICD-10-CM

## 2022-12-14 PROCEDURE — 99213 OFFICE O/P EST LOW 20 MIN: CPT | Mod: 25,S$PBB,, | Performed by: SPECIALIST

## 2022-12-14 PROCEDURE — 95983 ALYS BRN NPGT PRGRMG 15 MIN: CPT | Mod: PBBFAC | Performed by: SPECIALIST

## 2022-12-14 PROCEDURE — 99213 OFFICE O/P EST LOW 20 MIN: CPT | Mod: PBBFAC | Performed by: SPECIALIST

## 2022-12-14 PROCEDURE — 99999 PR PBB SHADOW E&M-EST. PATIENT-LVL III: CPT | Mod: PBBFAC,,, | Performed by: SPECIALIST

## 2022-12-14 PROCEDURE — 99999 PR PBB SHADOW E&M-EST. PATIENT-LVL III: ICD-10-PCS | Mod: PBBFAC,,, | Performed by: SPECIALIST

## 2022-12-14 PROCEDURE — 95983 PR ELEC ANALYSIS, IMPLT NEURO PULSE GEN, W/PRGRM, BRAIN, 1ST 15 MINS: ICD-10-PCS | Mod: S$PBB,,, | Performed by: SPECIALIST

## 2022-12-14 PROCEDURE — 95984 ALYS BRN NPGT PRGRMG ADDL 15: CPT | Mod: PBBFAC | Performed by: SPECIALIST

## 2022-12-14 PROCEDURE — 95984 ALYS BRN NPGT PRGRMG ADDL 15: CPT | Mod: S$PBB,,, | Performed by: SPECIALIST

## 2022-12-14 PROCEDURE — 95983 ALYS BRN NPGT PRGRMG 15 MIN: CPT | Mod: S$PBB,,, | Performed by: SPECIALIST

## 2022-12-14 PROCEDURE — 99213 PR OFFICE/OUTPT VISIT, EST, LEVL III, 20-29 MIN: ICD-10-PCS | Mod: 25,S$PBB,, | Performed by: SPECIALIST

## 2022-12-14 PROCEDURE — 95984 PR ELEC ANALYSIS, IMPLT NEURO PULSE GEN, W/PRGRM, BRAIN,  EA ADDTL 15 MINS: ICD-10-PCS | Mod: S$PBB,,, | Performed by: SPECIALIST

## 2022-12-14 RX ORDER — ASPIRIN 325 MG
325 TABLET ORAL EVERY OTHER DAY
COMMUNITY

## 2022-12-14 NOTE — PROGRESS NOTES
"Subjective:         Patient ID: Chandu Sosa is a 72 y.o. male.    Chief Complaint: Ess tremor DBS follow up     HPI:           Essential tremor (Pt reports continue w tremor; poor balance; diff w swallowing/States if he turns  down, swallowing improves, but tremor worsens)    notes may also be on facesheet for HPI, ROS, and other sections     Review of Systems  Dysphagia worsens when he adj his dbs device up / more voltage   He also notes that his nasal congestion improves in the evening at bedtime after he turns his dbs off     Falls: has to exercise caution and walks w cane             Social History     Socioeconomic History    Marital status:    Occupational History    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778 m)   Wt 86.2 kg (190 lb)   BMI 27.26 kg/m²     General:   If accompanied, by:_ w    heart__  Extremities:     Neurological  Speech:  vis fields:  EOMs:  motor  coord:   Gait: cane     Tremor:  marked B tremor espec when DBS turned off     Neuroimaging:  My comments:     Labs:    meds:      DBS, R VIM:   Initial  Settings:   Middle two electrodes and distal electrode neg, case +  4.3mA 70usec 125  Hz   Ending settings differential directionality and higher amplitude of 5.9mA    L VIM settings left unchanged at   Dot dot neg w directionality   3.6mA 30usec 125Hz " interleaved with dot neg with directionality dot dot and 4.8mA 40usec 125Hz   Programming time:  _._ 15 min   __. addn 15 min (total 30 minutes)           Assessment/Plan:       Problem List Items Addressed This Visit          Neuro    Essential tremor - Primary    Status post deep brain stimulator placement       GI    Dysphagia, neurologic       Other    Impairment of balance       Other comments/ follow up:          Hopefully dbs changes earn him sustained greater control of L tremor     Terry Camargo MD JOSIANE

## 2022-12-16 ENCOUNTER — PATIENT MESSAGE (OUTPATIENT)
Dept: NEUROLOGY | Facility: CLINIC | Age: 72
End: 2022-12-16
Payer: OTHER GOVERNMENT

## 2023-01-04 ENCOUNTER — OFFICE VISIT (OUTPATIENT)
Dept: NEUROLOGY | Facility: CLINIC | Age: 73
End: 2023-01-04
Payer: OTHER GOVERNMENT

## 2023-01-04 VITALS
DIASTOLIC BLOOD PRESSURE: 74 MMHG | WEIGHT: 181 LBS | SYSTOLIC BLOOD PRESSURE: 116 MMHG | BODY MASS INDEX: 25.91 KG/M2 | HEIGHT: 70 IN

## 2023-01-04 DIAGNOSIS — G25.0 ESSENTIAL TREMOR: Primary | ICD-10-CM

## 2023-01-04 DIAGNOSIS — Z96.89 STATUS POST DEEP BRAIN STIMULATOR PLACEMENT: ICD-10-CM

## 2023-01-04 PROCEDURE — 95983 ALYS BRN NPGT PRGRMG 15 MIN: CPT | Mod: S$PBB,,, | Performed by: NURSE PRACTITIONER

## 2023-01-04 PROCEDURE — 99213 OFFICE O/P EST LOW 20 MIN: CPT | Mod: PBBFAC | Performed by: NURSE PRACTITIONER

## 2023-01-04 PROCEDURE — 95983 PR ELEC ANALYSIS, IMPLT NEURO PULSE GEN, W/PRGRM, BRAIN, 1ST 15 MINS: ICD-10-PCS | Mod: S$PBB,,, | Performed by: NURSE PRACTITIONER

## 2023-01-04 PROCEDURE — 95983 ALYS BRN NPGT PRGRMG 15 MIN: CPT | Mod: PBBFAC | Performed by: NURSE PRACTITIONER

## 2023-01-04 PROCEDURE — 99214 OFFICE O/P EST MOD 30 MIN: CPT | Mod: 25,S$PBB,, | Performed by: NURSE PRACTITIONER

## 2023-01-04 PROCEDURE — 99999 PR PBB SHADOW E&M-EST. PATIENT-LVL III: CPT | Mod: PBBFAC,,, | Performed by: NURSE PRACTITIONER

## 2023-01-04 PROCEDURE — 99214 PR OFFICE/OUTPT VISIT, EST, LEVL IV, 30-39 MIN: ICD-10-PCS | Mod: 25,S$PBB,, | Performed by: NURSE PRACTITIONER

## 2023-01-04 PROCEDURE — 99999 PR PBB SHADOW E&M-EST. PATIENT-LVL III: ICD-10-PCS | Mod: PBBFAC,,, | Performed by: NURSE PRACTITIONER

## 2023-01-04 PROCEDURE — 95984 ALYS BRN NPGT PRGRMG ADDL 15: CPT | Mod: PBBFAC | Performed by: NURSE PRACTITIONER

## 2023-01-04 PROCEDURE — 95984 PR ELEC ANALYSIS, IMPLT NEURO PULSE GEN, W/PRGRM, BRAIN,  EA ADDTL 15 MINS: ICD-10-PCS | Mod: S$PBB,,, | Performed by: NURSE PRACTITIONER

## 2023-01-04 PROCEDURE — 95984 ALYS BRN NPGT PRGRMG ADDL 15: CPT | Mod: S$PBB,,, | Performed by: NURSE PRACTITIONER

## 2023-01-04 NOTE — PROGRESS NOTES
Neurology Follow up Note    Subjective:         Patient ID: Chandu Sosa is a 72 y.o. male.    Chief Complaint: DBS check    HPI:            Here for DBS check    Pt reports dx of COVID 12/18 and hospitalization for 5 days.     Reports worsening of LUE tremors; worsening unsteady gait, denies recent falls. Some sporadic swallowing diff but no worsening    Receiving PT in home - seems to be helping    Was anticipating visit with Paul Anne, [employer] rep, today however Paul was not able to make it therefore I worked with him to optimize his DBS settings.      ROS: as per HPI, otherwise pertinent systems review is negative          Past Medical History:   Diagnosis Date    AA (alcohol abuse)     in past    Arthritis     Cancer     Essential tremor     [place] (hard of hearing)     Neuropathy     Polyneuropathy 5/27/2022    Sleep apnea     does not use CPAP    Waldenstrom's macroglobulinemia        Past Surgical History:   Procedure Laterality Date    APPENDECTOMY      Bilateral VIM DBSand left chest IPG  07/02/2013    Dr. Francisco    CATARACT EXTRACTION Bilateral     COLONOSCOPY      DEEP BRAIN STIMULATOR PLACEMENT N/A 6/7/2022    Procedure: INSERTION, DEEP BRAIN STIMULATOR;  Surgeon: Kieran Saenz MD;  Location: [facility] OR;  Service: Neurosurgery;  Laterality: N/A;  Revision of right VIM DBS electrode, possible bilateral // [facility]UL [facility] CRIST[facility] //  [facility]    INSERTION OF DEEP BRAIN STIMULATOR GENERATOR N/A 6/21/2022    Procedure: INSERTION, PULSE GENERATOR, DEEP BRAIN STIMULATOR;  Surgeon: Kieran Saenz MD;  Location: [facility] OR;  Service: Neurosurgery;  Laterality: N/A;    RETINAL DETACHMENT SURGERY Bilateral     revision DBS electrode  06/07/2022    revision of right VIM DBS lead Right 09/15/2015    Dr. 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[place]    Tobacco Use    Smoking status: Former     Types: Cigars     Quit date:      Years since quittin.0    Smokeless tobacco: Never   Substance and Sexual Activity    Alcohol use: Yes     Alcohol/week: 4.0 standard drinks     Types: 4 Cans of beer per week    Drug use: Never       Review of patient's allergies indicates:   Allergen Reactions    Bee sting kit Hives and Shortness Of Breath         Current Outpatient Medications:     alpha lipoic acid 600 mg Cap, Take by mouth Daily., Disp: , Rfl:     aspirin 325 MG tablet, Take 325 mg by mouth every other day., Disp: , Rfl:     TUMERIC-GING-OLIVE-OREG-CAPRYL ORAL, Take 1 tablet by mouth once daily., Disp: , Rfl:      Objective:      Exam:   /74 (BP Location: Left arm, Patient Position: Sitting)   Ht 5' 10" (1.778 m)   Wt 82.1 kg (181 lb)   BMI 25.97 kg/m²     Physical Exam  Vitals reviewed.   Constitutional:       Appearance: Normal appearance.      Accompanied by wife  HENT:      Ears:      Comments: [place]; B hearing aids  Eyes:      Extraocular Movements: Extraocular movements intact.   Cardiovascular:      Rate and Rhythm: Normal rate and regular rhythm.   Pulmonary:      Effort: Pulmonary effort is normal.      Breath sounds: Normal breath sounds.   Musculoskeletal:         General: Normal range of motion.   Skin:     General: Skin is warm and dry.   Neurological:      General: No focal deficit present.      Mental Status: He is alert and oriented to person, place, and time.      BUE action tremor L>R  Psychiatric:         Mood and Affect: Mood normal.         Behavior: Behavior normal.          Assessment/Plan:     Problem List Items Addressed This Visit          Neuro    Essential tremor - Primary    Status post deep brain stimulator placement    DBS interogated today with multiple changes in settings attempted, even adding an interleaving program on the R VIM but " ultimately reverted back to original settings that he presented with today; Was anticipating visit with Paul Anne, [employer] rep, today however Paul was not able to make it therefore I worked with him to optimize his DBS settings.    Total time spent interrogating DBS: 45 minutes    R VIM:   Final Settings:   Middle two electrodes and distal electrode neg, case +  5.9 mA 70usec 125  Hz     Added an interleaving program #2 to R VIM however patient did not improve and in fact the LUE tremor got worse so reverted back to original settings.     L VIM:  L VIM settings left unchanged at   Dot dot neg w directionality   4.0 mA 30usec 125Hz interleaved with dot neg with directionality dot dot and 4.8mA 40usec 125Hz     Sebastian 70%            Miguel Napier, MSN, APRN, AGACNP-BC

## 2023-01-05 ENCOUNTER — DOCUMENTATION ONLY (OUTPATIENT)
Dept: ADMINISTRATIVE | Facility: HOSPITAL | Age: 73
End: 2023-01-05
Payer: OTHER GOVERNMENT

## 2023-01-20 ENCOUNTER — PATIENT MESSAGE (OUTPATIENT)
Dept: RESEARCH | Facility: HOSPITAL | Age: 73
End: 2023-01-20
Payer: OTHER GOVERNMENT

## 2023-03-22 ENCOUNTER — OFFICE VISIT (OUTPATIENT)
Dept: NEUROLOGY | Facility: CLINIC | Age: 73
End: 2023-03-22
Payer: OTHER GOVERNMENT

## 2023-03-22 VITALS
WEIGHT: 181 LBS | SYSTOLIC BLOOD PRESSURE: 124 MMHG | DIASTOLIC BLOOD PRESSURE: 72 MMHG | HEIGHT: 70 IN | BODY MASS INDEX: 25.91 KG/M2

## 2023-03-22 DIAGNOSIS — Z96.89 STATUS POST DEEP BRAIN STIMULATOR PLACEMENT: ICD-10-CM

## 2023-03-22 DIAGNOSIS — G25.0 ESSENTIAL TREMOR: Primary | ICD-10-CM

## 2023-03-22 PROCEDURE — 99212 OFFICE O/P EST SF 10 MIN: CPT | Mod: 25,S$PBB,, | Performed by: SPECIALIST

## 2023-03-22 PROCEDURE — 95983 ALYS BRN NPGT PRGRMG 15 MIN: CPT | Mod: S$PBB,,, | Performed by: SPECIALIST

## 2023-03-22 PROCEDURE — 95983 ALYS BRN NPGT PRGRMG 15 MIN: CPT | Mod: PBBFAC | Performed by: SPECIALIST

## 2023-03-22 PROCEDURE — 99999 PR PBB SHADOW E&M-EST. PATIENT-LVL III: CPT | Mod: PBBFAC,,, | Performed by: SPECIALIST

## 2023-03-22 PROCEDURE — 99212 PR OFFICE/OUTPT VISIT, EST, LEVL II, 10-19 MIN: ICD-10-PCS | Mod: 25,S$PBB,, | Performed by: SPECIALIST

## 2023-03-22 PROCEDURE — 99213 OFFICE O/P EST LOW 20 MIN: CPT | Mod: PBBFAC | Performed by: SPECIALIST

## 2023-03-22 PROCEDURE — 99999 PR PBB SHADOW E&M-EST. PATIENT-LVL III: ICD-10-PCS | Mod: PBBFAC,,, | Performed by: SPECIALIST

## 2023-03-22 PROCEDURE — 95983 PR ELEC ANALYSIS, IMPLT NEURO PULSE GEN, W/PRGRM, BRAIN, 1ST 15 MINS: ICD-10-PCS | Mod: S$PBB,,, | Performed by: SPECIALIST

## 2023-03-22 NOTE — PROGRESS NOTES
"Subjective:         Patient ID: Chandu Sosa is a 72 y.o. male.    Chief Complaint: ET/ DBS follow up     HPI:           F/U ET-DBS.  (Pts wife is here today. Pt states he has tremors all over his body and they are worse. Pt states he is off balance and has dizziness for months. Pt is sees an ENT who has odered PT for his balance. 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[place]   Tobacco Use    Smoking status: Former     Types: Cigars     Quit date:      Years since quittin.2    Smokeless tobacco: Never   Substance and Sexual Activity    Alcohol use: Yes     Alcohol/week: 4.0 standard drinks     Types: 4 Cans of beer per week    Drug use: Never         Current Outpatient Medications:     alpha lipoic acid 600 mg Cap, Take by mouth Daily., Disp: , Rfl:     aspirin 325 MG tablet, Take 325 mg by mouth every other day., Disp: , Rfl:     TUMERIC-GING-OLIVE-OREG-CAPRYL ORAL, Take 1 tablet by mouth once daily., Disp: , Rfl:      Objective:      Exam  /72   Ht 5' 10" (1.778 m)   Wt 82.1 kg (181 lb)   BMI 25.97 kg/m²     General:   If accompanied, by:_ wife     heart__  Extremities:     Neurological  Speech: ok   vis fields:  EOMs:  motor  coord:   Gait:  cane     Tremor:  B     Neuroimaging:  My comments:     Labs:    meds:      DBS, if present:   Settings:   Programming time:  _._ 15 min   __ addn 15 min   2 yrs 2 mos battery life remains at current settings   no program changes today         Assessment/Plan:       Problem List Items Addressed This Visit          Neuro    Essential tremor - Primary    Status post deep brain stimulator placement       Other comments/ " follow up:        He'll call if he needs           Aim follow up _ months    Terry Camargo MD JOSIANE

## 2023-04-01 ENCOUNTER — PATIENT MESSAGE (OUTPATIENT)
Dept: RESEARCH | Facility: HOSPITAL | Age: 73
End: 2023-04-01
Payer: OTHER GOVERNMENT

## 2023-04-20 ENCOUNTER — PATIENT MESSAGE (OUTPATIENT)
Dept: HEMATOLOGY/ONCOLOGY | Facility: CLINIC | Age: 73
End: 2023-04-20
Payer: OTHER GOVERNMENT

## 2023-04-24 DIAGNOSIS — C88.0 WALDENSTROM MACROGLOBULINEMIA: Primary | ICD-10-CM

## 2023-05-22 ENCOUNTER — PATIENT MESSAGE (OUTPATIENT)
Dept: NEUROLOGY | Facility: CLINIC | Age: 73
End: 2023-05-22
Payer: OTHER GOVERNMENT

## 2023-05-22 ENCOUNTER — PATIENT MESSAGE (OUTPATIENT)
Dept: HEMATOLOGY/ONCOLOGY | Facility: CLINIC | Age: 73
End: 2023-05-22
Payer: OTHER GOVERNMENT

## (undated) DEVICE — MARKER WRITESITE SKIN CHLRAPRP

## (undated) DEVICE — SUT STRATAFIX 3-0 30CM

## (undated) DEVICE — POSITIONER HEEL FOAM CONVOLTD

## (undated) DEVICE — CONTAINER SPECIMEN SCREW 4OZ

## (undated) DEVICE — GLOVE PROTEXIS BLUE LATEX 6.5

## (undated) DEVICE — COVER PROBE US 5.5X58L NON LTX

## (undated) DEVICE — SEE L#804

## (undated) DEVICE — Device

## (undated) DEVICE — SUT VICRYL 2-0 8-18 CP-2

## (undated) DEVICE — GLOVE PROTEXIS PI SYN SURG 6.0

## (undated) DEVICE — SHEET XLGE DRAPE

## (undated) DEVICE — SOL NACL IRR 1000ML BTL

## (undated) DEVICE — ELECTRODE MICRO TARGARETING

## (undated) DEVICE — TUBING IRR BIPOLAR CORD 12FT

## (undated) DEVICE — TAPE SILK 3IN

## (undated) DEVICE — TRAY CATH FOL SIL URIMTR 16FR

## (undated) DEVICE — NDL HYPO 22GX1 1/2 SYR 10ML LL

## (undated) DEVICE — DRAPE FLUID WARMER 44X44IN

## (undated) DEVICE — DISH PETRI MED 3.5IN

## (undated) DEVICE — SUT PROLENE 3-0 SH DA 36 BL

## (undated) DEVICE — NDL 27G X 1 1/4

## (undated) DEVICE — SEE MEDLINE ITEM 146292

## (undated) DEVICE — PERFORATOR SURG CRAN 14X11MM

## (undated) DEVICE — CLOSURE SKIN STERI STRIP 1/2X4

## (undated) DEVICE — DRIVER AUTO DISP OSTEODRIVE

## (undated) DEVICE — GLOVE PROTEXIS BLUE LATEX 7

## (undated) DEVICE — ELECTRODE PATIENT RETURN DISP

## (undated) DEVICE — SEALANT VISTASEAL FIBRIN 10ML

## (undated) DEVICE — TUBING SILICON CLR 3/16IN 10FT

## (undated) DEVICE — DRESSING TRANS 4X4 TEGADERM

## (undated) DEVICE — BLADE PEAK PLASMA

## (undated) DEVICE — INSTRUMENT FRAZIER 10FR W/VENT

## (undated) DEVICE — DURAPREP SURG SCRUB 26ML

## (undated) DEVICE — SUT 2-0 12-18IN SILK

## (undated) DEVICE — SOL .9NACL PF 100 ML

## (undated) DEVICE — DRESSING SURGICAL 3/4X3/4

## (undated) DEVICE — CLIPS RANEY SCALP FFS/ASSY

## (undated) DEVICE — SCREWDRIVER

## (undated) DEVICE — DRESSING TELFA N ADH 3X8

## (undated) DEVICE — GLOVE PROTEXIS PI SYN SURG 7.5

## (undated) DEVICE — TUBE SUCTION MEDI-VAC STERILE

## (undated) DEVICE — ADHESIVE DERMABOND ADVANCED

## (undated) DEVICE — POSITIONER HEAD ADULT

## (undated) DEVICE — PACK VARISPREED BATTERY

## (undated) DEVICE — BILATERAL NEXFRAME KIT

## (undated) DEVICE — KIT SURGIFLO HEMOSTATIC MATRIX

## (undated) DEVICE — SPONGE GAUZE 16PLY 4X4

## (undated) DEVICE — GLOVE PROTEXIS PI SYN SURG 6.5

## (undated) DEVICE — GLOVE PROTEXIS BLUE LATEX 8

## (undated) DEVICE — DRAPE SURGICAL STERI IRRG PCH

## (undated) DEVICE — SUT 3-0 12-18IN SILK

## (undated) DEVICE — GAUZE VISTEC XR DTECT 16 4X4IN

## (undated) DEVICE — DRAPE STERI INSTRUMENT 1018

## (undated) DEVICE — SPONGE SURGIFOAM 100 8.5X12X10

## (undated) DEVICE — SEE MEDLINE ITEM 147518

## (undated) DEVICE — KIT SURGICAL TURNOVER

## (undated) DEVICE — DRAPE IOBAN 2 STERI

## (undated) DEVICE — SUT BONE WAX 2.5 GRMS 12/BX

## (undated) DEVICE — DRESSING SURGICAL 1/2X1/2

## (undated) DEVICE — DRAPE INCISE IOBAN 2 23X23IN

## (undated) DEVICE — KIT ACC TWIST LOCK CABLE

## (undated) DEVICE — GAUZE SPONGE 4'X4 12 PLY

## (undated) DEVICE — STAPLER SKIN WIDE